# Patient Record
Sex: FEMALE | Race: WHITE | NOT HISPANIC OR LATINO | Employment: OTHER | URBAN - METROPOLITAN AREA
[De-identification: names, ages, dates, MRNs, and addresses within clinical notes are randomized per-mention and may not be internally consistent; named-entity substitution may affect disease eponyms.]

---

## 2019-01-02 ENCOUNTER — COMMUNICATION - HEALTHEAST (OUTPATIENT)
Dept: TELEHEALTH | Facility: CLINIC | Age: 64
End: 2019-01-02

## 2019-01-02 ENCOUNTER — OFFICE VISIT - HEALTHEAST (OUTPATIENT)
Dept: FAMILY MEDICINE | Facility: CLINIC | Age: 64
End: 2019-01-02

## 2019-01-02 DIAGNOSIS — Z00.00 ROUTINE GENERAL MEDICAL EXAMINATION AT A HEALTH CARE FACILITY: ICD-10-CM

## 2019-01-02 DIAGNOSIS — Z12.11 SCREEN FOR COLON CANCER: ICD-10-CM

## 2019-01-02 DIAGNOSIS — Z12.31 VISIT FOR SCREENING MAMMOGRAM: ICD-10-CM

## 2019-01-02 DIAGNOSIS — I10 ESSENTIAL HYPERTENSION: ICD-10-CM

## 2019-01-02 DIAGNOSIS — E66.01 CLASS 2 SEVERE OBESITY DUE TO EXCESS CALORIES WITH SERIOUS COMORBIDITY AND BODY MASS INDEX (BMI) OF 35.0 TO 35.9 IN ADULT (H): ICD-10-CM

## 2019-01-02 DIAGNOSIS — E66.812 CLASS 2 SEVERE OBESITY DUE TO EXCESS CALORIES WITH SERIOUS COMORBIDITY AND BODY MASS INDEX (BMI) OF 35.0 TO 35.9 IN ADULT (H): ICD-10-CM

## 2019-01-02 LAB
ANION GAP SERPL CALCULATED.3IONS-SCNC: 12 MMOL/L (ref 5–18)
BUN SERPL-MCNC: 20 MG/DL (ref 8–22)
CALCIUM SERPL-MCNC: 9.7 MG/DL (ref 8.5–10.5)
CHLORIDE BLD-SCNC: 105 MMOL/L (ref 98–107)
CHOLEST SERPL-MCNC: 267 MG/DL
CO2 SERPL-SCNC: 24 MMOL/L (ref 22–31)
CREAT SERPL-MCNC: 0.89 MG/DL (ref 0.6–1.1)
FASTING STATUS PATIENT QL REPORTED: YES
GFR SERPL CREATININE-BSD FRML MDRD: >60 ML/MIN/1.73M2
GLUCOSE BLD-MCNC: 103 MG/DL (ref 70–125)
HDLC SERPL-MCNC: 54 MG/DL
LDLC SERPL CALC-MCNC: 178 MG/DL
POTASSIUM BLD-SCNC: 4 MMOL/L (ref 3.5–5)
SODIUM SERPL-SCNC: 141 MMOL/L (ref 136–145)
TRIGL SERPL-MCNC: 177 MG/DL

## 2019-01-02 ASSESSMENT — MIFFLIN-ST. JEOR: SCORE: 1360.75

## 2020-01-08 ENCOUNTER — OFFICE VISIT - HEALTHEAST (OUTPATIENT)
Dept: FAMILY MEDICINE | Facility: CLINIC | Age: 65
End: 2020-01-08

## 2020-01-08 DIAGNOSIS — I10 ESSENTIAL HYPERTENSION: ICD-10-CM

## 2020-01-08 DIAGNOSIS — E66.3 OVERWEIGHT: ICD-10-CM

## 2020-01-08 DIAGNOSIS — Z12.31 VISIT FOR SCREENING MAMMOGRAM: ICD-10-CM

## 2020-01-08 DIAGNOSIS — R73.03 PREDIABETES: ICD-10-CM

## 2020-01-08 DIAGNOSIS — Z00.00 ROUTINE GENERAL MEDICAL EXAMINATION AT A HEALTH CARE FACILITY: ICD-10-CM

## 2020-01-08 DIAGNOSIS — Z12.11 SCREEN FOR COLON CANCER: ICD-10-CM

## 2020-01-08 DIAGNOSIS — R73.01 ELEVATED FASTING GLUCOSE: ICD-10-CM

## 2020-01-08 LAB
CHOLEST SERPL-MCNC: 233 MG/DL
FASTING STATUS PATIENT QL REPORTED: YES
FASTING STATUS PATIENT QL REPORTED: YES
GLUCOSE BLD-MCNC: 102 MG/DL (ref 70–99)
HBA1C MFR BLD: 5.8 % (ref 3.5–6)
HDLC SERPL-MCNC: 67 MG/DL
HGB BLD-MCNC: 13.7 G/DL (ref 12–16)
LDLC SERPL CALC-MCNC: 144 MG/DL
TRIGL SERPL-MCNC: 111 MG/DL

## 2020-01-08 ASSESSMENT — MIFFLIN-ST. JEOR: SCORE: 1169.33

## 2020-02-04 ENCOUNTER — HOSPITAL ENCOUNTER (OUTPATIENT)
Dept: MAMMOGRAPHY | Facility: CLINIC | Age: 65
Discharge: HOME | End: 2020-02-04
Attending: FAMILY MEDICINE

## 2020-02-04 DIAGNOSIS — Z12.31 VISIT FOR SCREENING MAMMOGRAM: ICD-10-CM

## 2020-02-05 ENCOUNTER — RECORDS - HEALTHEAST (OUTPATIENT)
Dept: RADIOLOGY | Facility: CLINIC | Age: 65
End: 2020-02-05

## 2020-02-05 ENCOUNTER — RECORDS - HEALTHEAST (OUTPATIENT)
Dept: ADMINISTRATIVE | Facility: OTHER | Age: 65
End: 2020-02-05

## 2020-02-23 ENCOUNTER — HEALTH MAINTENANCE LETTER (OUTPATIENT)
Age: 65
End: 2020-02-23

## 2020-03-16 ENCOUNTER — RECORDS - HEALTHEAST (OUTPATIENT)
Dept: ADMINISTRATIVE | Facility: OTHER | Age: 65
End: 2020-03-16

## 2020-03-30 ENCOUNTER — RECORDS - HEALTHEAST (OUTPATIENT)
Dept: ADMINISTRATIVE | Facility: OTHER | Age: 65
End: 2020-03-30

## 2020-04-14 ENCOUNTER — RECORDS - HEALTHEAST (OUTPATIENT)
Dept: ADMINISTRATIVE | Facility: OTHER | Age: 65
End: 2020-04-14

## 2020-04-30 ENCOUNTER — RECORDS - HEALTHEAST (OUTPATIENT)
Dept: ADMINISTRATIVE | Facility: OTHER | Age: 65
End: 2020-04-30

## 2020-05-14 ENCOUNTER — OFFICE VISIT - HEALTHEAST (OUTPATIENT)
Dept: FAMILY MEDICINE | Facility: CLINIC | Age: 65
End: 2020-05-14

## 2020-05-14 DIAGNOSIS — E66.3 OVERWEIGHT: ICD-10-CM

## 2020-05-14 DIAGNOSIS — R73.03 PREDIABETES: ICD-10-CM

## 2020-05-14 DIAGNOSIS — Z01.818 PRE-OPERATIVE EXAMINATION: ICD-10-CM

## 2020-05-14 DIAGNOSIS — M16.12 ARTHRITIS OF LEFT HIP: ICD-10-CM

## 2020-05-14 DIAGNOSIS — E87.6 HYPOKALEMIA: ICD-10-CM

## 2020-05-14 DIAGNOSIS — I10 ESSENTIAL HYPERTENSION: ICD-10-CM

## 2020-05-14 LAB
ALBUMIN UR-MCNC: NEGATIVE MG/DL
ANION GAP SERPL CALCULATED.3IONS-SCNC: 12 MMOL/L (ref 5–18)
APPEARANCE UR: CLEAR
ATRIAL RATE - MUSE: 68 BPM
BACTERIA #/AREA URNS HPF: ABNORMAL HPF
BILIRUB UR QL STRIP: NEGATIVE
BUN SERPL-MCNC: 29 MG/DL (ref 8–22)
CALCIUM SERPL-MCNC: 10.1 MG/DL (ref 8.5–10.5)
CHLORIDE BLD-SCNC: 99 MMOL/L (ref 98–107)
CO2 SERPL-SCNC: 28 MMOL/L (ref 22–31)
COLOR UR AUTO: YELLOW
CREAT SERPL-MCNC: 1.35 MG/DL (ref 0.6–1.1)
DIASTOLIC BLOOD PRESSURE - MUSE: NORMAL
ERYTHROCYTE [DISTWIDTH] IN BLOOD BY AUTOMATED COUNT: 11.6 % (ref 11–14.5)
GFR SERPL CREATININE-BSD FRML MDRD: 39 ML/MIN/1.73M2
GLUCOSE BLD-MCNC: 95 MG/DL (ref 70–125)
GLUCOSE UR STRIP-MCNC: NEGATIVE MG/DL
HBA1C MFR BLD: 5.7 % (ref 3.5–6)
HCT VFR BLD AUTO: 42 % (ref 35–47)
HGB BLD-MCNC: 13.9 G/DL (ref 12–16)
HGB UR QL STRIP: NEGATIVE
HYALINE CASTS #/AREA URNS LPF: ABNORMAL LPF
INTERPRETATION ECG - MUSE: NORMAL
KETONES UR STRIP-MCNC: NEGATIVE MG/DL
LEUKOCYTE ESTERASE UR QL STRIP: ABNORMAL
MCH RBC QN AUTO: 29.6 PG (ref 27–34)
MCHC RBC AUTO-ENTMCNC: 33.1 G/DL (ref 32–36)
MCV RBC AUTO: 89 FL (ref 80–100)
MUCOUS THREADS #/AREA URNS LPF: ABNORMAL LPF
NITRATE UR QL: NEGATIVE
P AXIS - MUSE: 34 DEGREES
PH UR STRIP: 5 [PH] (ref 5–8)
PLATELET # BLD AUTO: 256 THOU/UL (ref 140–440)
PMV BLD AUTO: 7.2 FL (ref 7–10)
POTASSIUM BLD-SCNC: 3.2 MMOL/L (ref 3.5–5)
PR INTERVAL - MUSE: 198 MS
QRS DURATION - MUSE: 76 MS
QT - MUSE: 414 MS
QTC - MUSE: 440 MS
R AXIS - MUSE: -9 DEGREES
RBC # BLD AUTO: 4.71 MILL/UL (ref 3.8–5.4)
RBC #/AREA URNS AUTO: ABNORMAL HPF
SODIUM SERPL-SCNC: 139 MMOL/L (ref 136–145)
SP GR UR STRIP: 1.02 (ref 1–1.03)
SQUAMOUS #/AREA URNS AUTO: ABNORMAL LPF
SYSTOLIC BLOOD PRESSURE - MUSE: NORMAL
T AXIS - MUSE: -1 DEGREES
TRANS CELLS #/AREA URNS HPF: ABNORMAL LPF
UROBILINOGEN UR STRIP-ACNC: ABNORMAL
VENTRICULAR RATE- MUSE: 68 BPM
WBC #/AREA URNS AUTO: ABNORMAL HPF
WBC: 5.4 THOU/UL (ref 4–11)

## 2020-05-14 ASSESSMENT — MIFFLIN-ST. JEOR: SCORE: 1188.39

## 2020-05-15 LAB — BACTERIA SPEC CULT: NO GROWTH

## 2020-05-28 ENCOUNTER — AMBULATORY - HEALTHEAST (OUTPATIENT)
Dept: LAB | Facility: CLINIC | Age: 65
End: 2020-05-28

## 2020-05-28 DIAGNOSIS — E87.6 HYPOKALEMIA: ICD-10-CM

## 2020-05-28 DIAGNOSIS — I10 ESSENTIAL HYPERTENSION: ICD-10-CM

## 2020-05-28 LAB
ANION GAP SERPL CALCULATED.3IONS-SCNC: 9 MMOL/L (ref 5–18)
BUN SERPL-MCNC: 19 MG/DL (ref 8–22)
CALCIUM SERPL-MCNC: 9.4 MG/DL (ref 8.5–10.5)
CHLORIDE BLD-SCNC: 104 MMOL/L (ref 98–107)
CO2 SERPL-SCNC: 26 MMOL/L (ref 22–31)
CREAT SERPL-MCNC: 1.08 MG/DL (ref 0.6–1.1)
GFR SERPL CREATININE-BSD FRML MDRD: 51 ML/MIN/1.73M2
GLUCOSE BLD-MCNC: 88 MG/DL (ref 70–125)
POTASSIUM BLD-SCNC: 3.6 MMOL/L (ref 3.5–5)
SODIUM SERPL-SCNC: 139 MMOL/L (ref 136–145)

## 2020-06-01 ENCOUNTER — COMMUNICATION - HEALTHEAST (OUTPATIENT)
Dept: FAMILY MEDICINE | Facility: CLINIC | Age: 65
End: 2020-06-01

## 2020-06-02 ENCOUNTER — AMBULATORY - HEALTHEAST (OUTPATIENT)
Dept: SURGERY | Facility: CLINIC | Age: 65
End: 2020-06-02

## 2020-06-02 DIAGNOSIS — Z11.59 ENCOUNTER FOR SCREENING FOR OTHER VIRAL DISEASES: ICD-10-CM

## 2020-06-04 ASSESSMENT — MIFFLIN-ST. JEOR: SCORE: 1135.1

## 2020-06-08 ENCOUNTER — AMBULATORY - HEALTHEAST (OUTPATIENT)
Dept: FAMILY MEDICINE | Facility: CLINIC | Age: 65
End: 2020-06-08

## 2020-06-08 DIAGNOSIS — Z11.59 ENCOUNTER FOR SCREENING FOR OTHER VIRAL DISEASES: ICD-10-CM

## 2020-06-10 ENCOUNTER — SURGERY - HEALTHEAST (OUTPATIENT)
Dept: SURGERY | Facility: CLINIC | Age: 65
End: 2020-06-10

## 2020-06-10 ENCOUNTER — ANESTHESIA - HEALTHEAST (OUTPATIENT)
Dept: SURGERY | Facility: CLINIC | Age: 65
End: 2020-06-10

## 2020-06-10 ASSESSMENT — MIFFLIN-ST. JEOR
SCORE: 1184.99
SCORE: 1184.99

## 2020-06-19 ENCOUNTER — RECORDS - HEALTHEAST (OUTPATIENT)
Dept: ADMINISTRATIVE | Facility: OTHER | Age: 65
End: 2020-06-19

## 2020-07-17 ENCOUNTER — RECORDS - HEALTHEAST (OUTPATIENT)
Dept: ADMINISTRATIVE | Facility: OTHER | Age: 65
End: 2020-07-17

## 2020-09-08 ENCOUNTER — RECORDS - HEALTHEAST (OUTPATIENT)
Dept: ADMINISTRATIVE | Facility: OTHER | Age: 65
End: 2020-09-08

## 2020-10-01 ENCOUNTER — RECORDS - HEALTHEAST (OUTPATIENT)
Dept: ADMINISTRATIVE | Facility: OTHER | Age: 65
End: 2020-10-01

## 2020-10-12 ENCOUNTER — COMMUNICATION - HEALTHEAST (OUTPATIENT)
Dept: FAMILY MEDICINE | Facility: CLINIC | Age: 65
End: 2020-10-12

## 2020-11-17 ENCOUNTER — COMMUNICATION - HEALTHEAST (OUTPATIENT)
Dept: FAMILY MEDICINE | Facility: CLINIC | Age: 65
End: 2020-11-17

## 2020-11-17 DIAGNOSIS — I10 ESSENTIAL HYPERTENSION: ICD-10-CM

## 2020-11-29 ENCOUNTER — HEALTH MAINTENANCE LETTER (OUTPATIENT)
Age: 65
End: 2020-11-29

## 2021-02-16 ENCOUNTER — COMMUNICATION - HEALTHEAST (OUTPATIENT)
Dept: FAMILY MEDICINE | Facility: CLINIC | Age: 66
End: 2021-02-16

## 2021-04-10 ENCOUNTER — HEALTH MAINTENANCE LETTER (OUTPATIENT)
Age: 66
End: 2021-04-10

## 2021-05-15 ENCOUNTER — COMMUNICATION - HEALTHEAST (OUTPATIENT)
Dept: FAMILY MEDICINE | Facility: CLINIC | Age: 66
End: 2021-05-15

## 2021-05-15 DIAGNOSIS — I10 ESSENTIAL HYPERTENSION: ICD-10-CM

## 2021-06-02 VITALS — WEIGHT: 193 LBS | HEIGHT: 62 IN | BODY MASS INDEX: 35.51 KG/M2

## 2021-06-04 VITALS
SYSTOLIC BLOOD PRESSURE: 116 MMHG | DIASTOLIC BLOOD PRESSURE: 74 MMHG | WEIGHT: 150.8 LBS | BODY MASS INDEX: 27.75 KG/M2 | HEIGHT: 62 IN | HEART RATE: 72 BPM

## 2021-06-04 VITALS
BODY MASS INDEX: 28.52 KG/M2 | HEART RATE: 80 BPM | WEIGHT: 155 LBS | RESPIRATION RATE: 16 BRPM | SYSTOLIC BLOOD PRESSURE: 122 MMHG | DIASTOLIC BLOOD PRESSURE: 82 MMHG | HEIGHT: 62 IN | OXYGEN SATURATION: 95 % | TEMPERATURE: 98.3 F

## 2021-06-04 VITALS — BODY MASS INDEX: 29.45 KG/M2 | WEIGHT: 156 LBS | HEIGHT: 61 IN

## 2021-06-05 NOTE — PATIENT INSTRUCTIONS - HE
Decrease HCTZ from 25mg to 12.5mg for 1-2 weeks.   Check BPs at home. If BPs are still lower than 135/85, can consider stopping this medication after 1-2 weeks.   If BPs elevated again, go back to 12.5mg.     Goal is <140/90, but even 135/85 for someone like yourself!    There is a new shingles vaccine that is 97% effective. It is the Shingrix vaccine and is recommended for those 50 and older. We recommend the vaccine even if you have had shingles or the older vaccine against shingles- Zostavax. Insurance coverage for the vaccine varies. I recommend you check with your insurance to verify if, when and where it is covered. The vaccine is covered by most commercial insurance but Medicare does not cover the vaccine. It may be covered by Medicare Part D (your drug/pharmacy plan) and sometimes it is covered only at a pharmacy instead of here in the clinic. If it is covered in the clinic, you may schedule a nurse visit anytime to get the first dose of the vaccine. The second dose is recommended two months after the first and should be gotten by 6 months after the first.   About 10% of people will get a sore, red reaction at the site of the injection. Some people may also feel a little sick or nauseated after the injection. This may happen with either the first and/or second vaccine.

## 2021-06-08 NOTE — ANESTHESIA PREPROCEDURE EVALUATION
Anesthesia Evaluation      Patient summary reviewed   No history of anesthetic complications     Airway   Mallampati: III  Neck ROM: full   Pulmonary - negative ROS and normal exam    breath sounds clear to auscultation                         Cardiovascular - normal exam  Exercise tolerance: > or = 4 METS  (+) hypertension, ,     ECG reviewed  Rhythm: regular  Rate: normal,         Neuro/Psych - negative ROS     Endo/Other    (+) arthritis, obesity,      GI/Hepatic/Renal - negative ROS           Dental    (+) poor dentition and chipped                       Anesthesia Plan  Planned anesthetic: spinal  SAB with isobaric bupivacaine.    Controlled hypotension requested by surgeon.  ASA 2     Anesthetic plan and risks discussed with: patient  Anesthesia plan special considerations: antiemetics,   Post-op plan: routine recovery

## 2021-06-08 NOTE — PATIENT INSTRUCTIONS - HE
Before Your Surgery       Call your surgeon if there is any change in your health. This includes signs of a cold or flu (such as a sore throat, runny nose, cough, rash or fever).       Do not smoke, drink alcohol or take over the counter medicine (unless your surgeon or primary care doctor tells you to) for the 24 hours before and after surgery.       If you take prescribed drugs: Follow your doctor s orders about which medicines to take and which to stop until after surgery.      Eating and drinking prior to surgery: follow the instructions from your surgeon.      Take a shower or bath the night before surgery. Use the soap your surgeon gave you to gently clean your skin. If you do not have soap from your surgeon, use your regular soap. Do not shave or scrub the surgery site. Wear clean pajamas and have clean sheets on your bed.             Hold all supplements, aspirin and NSAIDs for 7 days prior to surgery.    Follow your surgeon's direction on when to stop eating and drinking prior to surgery.

## 2021-06-08 NOTE — PROGRESS NOTES
Preoperative Exam    Scheduled Procedure: Left Total Hip Replacement  Surgery Date:  6/10/2020  Surgery Location: [facility], fax [phone]    Surgeon:  Dr. Be Whitt    Assessment/Plan:     1. Pre-operative examination  2. Arthritis of left hip  Medically optimized to proceed with surgery.   - Will need COVID screening by PCR prior to surgery to be ordered by surgeon.   - Electrocardiogram Perform and Read: NSR with VR 68, no pathologic q waves, non specific t wave flattening in III; clinically asymptomatic. No contraindication for proceeding with surgery   - HM2(CBC w/o Differential)- Hb 13.9, Plts 256  - Basic Metabolic Panel- K is mildly low at 3.2, and BUn elevated at 29 with Cr slightly up at 1.35- suspect dehydration in setting of recent HCTZ dose increase (pt did this on her own due to intermittently high BPs): recheck after rehydrating early next week:  BMP 5/28 normalized after hydration. K 3.6, Cr 1.08, BUN 19.   - Urinalysis Macro & Micro- trace leukocytes, but culture is no growth  - Culture, Urine- no growth     3. Essential hypertension  Well controlled on 12.5mg HCTZ (2 days she took HCTZ at 25mg dose due to mildly elevated BPs); reviewed recommendations to take this on AM of surgery vs holding and she agrees to continue med morning of surgery.   - BMP     4. Prediabetes  Last A1c 5.8% in Jan; will recheck with labs today.   - Glycosylated Hemoglobin A1c      5. Overweight  Obviously her goal is to get back to physical activity and this elective hip replacement surgery will better facilitate this.  She is looking forward to improving quality of life and ability to exercise going forward.        Surgical Procedure Risk: Intermediate (reported cardiac risk generally 1-5%)  Have you had prior anesthesia?: No  Have you or any family members had a previous anesthesia reaction:  No  Do you or any family members have a history of a clotting or bleeding disorder?: No  Cardiac Risk Assessment: no  "increased risk for major cardiac complications    APPROVAL GIVEN to proceed with proposed procedure, after further diagnostic evaluation (needs COVID testing per surgeon)    Functional Status: Independent  Patient plans to recover at home with family.     Subjective:      Glenys Garcia is a 64 y.o. female who presents for a preoperative consultation.  She has left hip pain and recently escalating in Jan and Feb. Causing severe pain with ambulation. Had two sessions of PT and this exacerbated the pain. Left hip and groin pain.   Has not done any cortisone shots as she desired more urgent and sustained relief with operation.  Goal to get back to her normal routine.   Using advil, tylenol and tramadol sparingly when she needs to go in the car.     She did travel to [place]- [facility] 3/7 after a 1 month stay.     HTN: Bps \"really good\" in Feb and March in the 120s/70s. Checks twice weekly.  Went to 130s/80s the past few weeks due to worsening pain. Bps have never been over 140/90.  She did increase her HCTZ 12.5mg to 25mg for a few days when HTN was high. For the past week she has been back down to 12.5mg daily.     All other systems reviewed and are negative, other than those listed in the HPI.    Pertinent History  Do you have difficulty breathing or chest pain after walking up a flight of stairs: No  History of obstructive sleep apnea: No  Steroid use in the last 6 months: No  Frequent Aspirin/NSAID use: Yes: frequent advil use  Prior Blood Transfusion: No  Prior Blood Transfusion Reaction: No  If for some reason prior to, during or after the procedure, if it is medically indicated, would you be willing to have a blood transfusion?:  There is no transfusion refusal.    Current Outpatient Medications   Medication Sig Dispense Refill     hydroCHLOROthiazide (HYDRODIURIL) 12.5 MG tablet Take 1 tablet (12.5 mg total) by mouth daily. 90 tablet 3     multivitamin (ONE A DAY) Take by mouth.       traMADoL (ULTRAM) 50 mg " "tablet TAKE 1 2 TABLET EVERY 6 8 HOURS AS NEEDED FOR PAIN.       No current facility-administered medications for this visit.         No Known Allergies    Patient Active Problem List   Diagnosis     Overweight     Essential hypertension     Prediabetes       History reviewed. No pertinent past medical history.    History reviewed. No pertinent surgical history.    Social History     Social History Narrative    Living in a [facility]. . 2 children, 4 grandchildren; all live in the area. 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 Nonsmoker; no alcohol.     Amy Donaldson MD         Patient Care Team:  Amy Donaldson MD as PCP - General (Family Medicine)  Amy Donaldson MD as Assigned PCP          Objective:     Vitals:    05/14/20 0953   BP: 122/82   Pulse: 80   Resp: 16   Temp: 98.3  F (36.8  C)   TempSrc: Oral   SpO2: 95%   Weight: 155 lb (70.3 kg)   Height: 5' 1.5\" (1.562 m)         Physical Exam:  Constitutional: Patient is oriented to person, place, and time. Patient appears well-developed and well-nourished. No distress.   Head: Normocephalic and atraumatic.   Ears: External ear and TMs normal bilaterally.  Nose: Nose normal.   Mouth/Throat: Oropharynx is clear and moist. No oropharyngeal exudate.   Eyes: Conjunctivae and EOM are normal. Pupils are equal, round, and reactive to light. No discharge. No scleral icterus.   Neck: Neck supple. No JVD present. No tracheal deviation present. No thyromegaly present.  Cardiovascular: Normal rate, regular rhythm, normal heart sounds and intact distal pulses. No murmur heard.   Pulmonary/Chest: Effort normal and breath sounds normal. Patient has no wheezes, no rales, exhibits no tenderness.   Abdominal: Soft. Bowel sounds are normal. No masses. There is no tenderness.   Lymphadenopathy:  Patient has no cervical adenopathy.   Neurological: Patient is alert and oriented to person, place, and time. Patient has normal reflexes. No cranial " nerve deficit. Coordination normal.   Skin: Skin is warm and dry. No rash noted. No pallor.   MSK: Pain at the left inner groin to palpation in the left buttocks. Negative seated straight leg raise but this does cause discomfort in the hip and groin area on the left side.    Psychiatric: Patient has good eye contact without any psychomotor retardation or stereotypic behaviors.  normal mood and affect. Judgment and thought content normal.   Speech is regular rate and rhythm.       Patient Instructions      Before Your Surgery       Call your surgeon if there is any change in your health. This includes signs of a cold or flu (such as a sore throat, runny nose, cough, rash or fever).       Do not smoke, drink alcohol or take over the counter medicine (unless your surgeon or primary care doctor tells you to) for the 24 hours before and after surgery.       If you take prescribed drugs: Follow your doctor s orders about which medicines to take and which to stop until after surgery.      Eating and drinking prior to surgery: follow the instructions from your surgeon.      Take a shower or bath the night before surgery. Use the soap your surgeon gave you to gently clean your skin. If you do not have soap from your surgeon, use your regular soap. Do not shave or scrub the surgery site. Wear clean pajamas and have clean sheets on your bed.             Hold all supplements, aspirin and NSAIDs for 7 days prior to surgery.    Follow your surgeon's direction on when to stop eating and drinking prior to surgery.          Labs:  Recent Results (from the past 24 hour(s))   Electrocardiogram Perform and Read    Collection Time: 05/14/20 10:37 AM   Result Value Ref Range    SYSTOLIC BLOOD PRESSURE      DIASTOLIC BLOOD PRESSURE      VENTRICULAR RATE 68 BPM    ATRIAL RATE 68 BPM    P-R INTERVAL 198 ms    QRS DURATION 76 ms    Q-T INTERVAL 414 ms    QTC CALCULATION (BEZET) 440 ms    P Axis 34 degrees    R AXIS -9 degrees    T AXIS -1  degrees    MUSE DIAGNOSIS       Normal sinus rhythm  Nonspecific ST and T wave abnormality  Abnormal ECG  No previous ECGs available       Results for orders placed or performed in visit on 05/14/20   Basic Metabolic Panel   Result Value Ref Range    Sodium 139 136 - 145 mmol/L    Potassium 3.2 (L) 3.5 - 5.0 mmol/L    Chloride 99 98 - 107 mmol/L    CO2 28 22 - 31 mmol/L    Anion Gap, Calculation 12 5 - 18 mmol/L    Glucose 95 70 - 125 mg/dL    Calcium 10.1 8.5 - 10.5 mg/dL    BUN 29 (H) 8 - 22 mg/dL    Creatinine 1.35 (H) 0.60 - 1.10 mg/dL    GFR MDRD Af Amer 48 (L) >60 mL/min/1.73m2    GFR MDRD Non Af Amer 39 (L) >60 mL/min/1.73m2     Lab Results   Component Value Date    HGBA1C 5.7 05/14/2020     Lab Results   Component Value Date    WBC 5.4 05/14/2020    HGB 13.9 05/14/2020    HCT 42.0 05/14/2020    MCV 89 05/14/2020     05/14/2020       Urine culture is no growth        Immunization History   Administered Date(s) Administered     Pneumo Conj 13-V (2010&after) 01/08/2020     Td, Adult, Absorbed 09/23/1991, 01/14/2003     Td, adult adsorbed, PF 06/01/2005     Tdap 07/16/2013     ZOSTER, LIVE 11/19/2015     ZOSTER, RECOMBINANT, IM 01/12/2020, 03/16/2020           Electronically signed by Amy Donaldson MD 05/14/20 8:36 AM

## 2021-06-08 NOTE — ANESTHESIA CARE TRANSFER NOTE
Last vitals:   Vitals:    06/10/20 1333   BP: 92/55   Pulse: 77   Resp: 16   Temp: (!) 35.8  C (96.5  F)   SpO2: 98%     Patient's level of consciousness is awake  Spontaneous respirations: yes  Maintains airway independently: yes  Dentition unchanged: yes  Oropharynx: oropharynx clear of all foreign objects    QCDR Measures:  ASA# 20 - Surgical Safety Checklist: WHO surgical safety checklist completed prior to induction    PQRS# 430 - Adult PONV Prevention: NA - Not adult patient, not GA or 3 or more risk factors NOT present  ASA# 8 - Peds PONV Prevention: NA - Not pediatric patient, not GA or 2 or more risk factors NOT present  PQRS# 424 - Terra-op Temp Management: 4559F - At least one body temp DOCUMENTED => 35.5C or 95.9F within required timeframe  PQRS# 426 - PACU Transfer Protocol: - Transfer of care checklist used  ASA# 14 - Acute Post-op Pain: ASA14B - Patient did NOT experience pain >= 7 out of 10

## 2021-06-08 NOTE — ANESTHESIA PROCEDURE NOTES
Spinal Block    Patient location during procedure: OR  Start time: 6/10/2020 11:28 AM  End time: 6/10/2020 11:29 AM  Reason for block: at surgeon's request and primary anesthetic    Staffing:  Performing  Anesthesiologist: Maryjane Swift MD    Preanesthetic Checklist  Completed: patient identified, risks, benefits, and alternatives discussed, timeout performed, consent obtained, at patient's request, airway assessed, oxygen available, suction available, emergency drugs available and hand hygiene performed  Spinal Block  Patient position: sitting  Prep: ChloraPrep  Patient monitoring: heart rate, cardiac monitor, continuous pulse ox and blood pressure  Approach: midline  Location: L2-3  Injection technique: single-shot  Needle type: pencil-tip   Needle gauge: 24 G      Additional Notes:  No issues.  Good block

## 2021-06-08 NOTE — PROGRESS NOTES
Read patient Dr. Donaldson's result note regarding lab results and helped schedule her for labs to be rechecked 5/28/20 per patient preference.   Radha Peguero LPN

## 2021-06-08 NOTE — ANESTHESIA POSTPROCEDURE EVALUATION
Patient: Glenys Garcia  Procedure(s):  DIRECT ANTERIOR TOTAL HIP ARTHROPLASTY (Left)  Anesthesia type: spinal    Patient location: PACU  Last vitals:   Vitals Value Taken Time   BP 94/50 6/10/2020  1:52 PM   Temp 35.8  C (96.5  F) 6/10/2020  1:33 PM   Pulse 71 6/10/2020  2:00 PM   Resp 18 6/10/2020  2:00 PM   SpO2 98 % 6/10/2020  2:00 PM   Vitals shown include unvalidated device data.  Post vital signs: stable  Level of consciousness: awake and responds to simple questions  Post-anesthesia pain: pain controlled  Post-anesthesia nausea and vomiting: no  Pulmonary: unassisted, return to baseline  Cardiovascular: stable and blood pressure at baseline  Hydration: adequate  Anesthetic events: no    QCDR Measures:  ASA# 11 - Terra-op Cardiac Arrest: ASA11B - Patient did NOT experience unanticipated cardiac arrest  ASA# 12 - Terra-op Mortality Rate: ASA12B - Patient did NOT die  ASA# 13 - PACU Re-Intubation Rate: NA - No ETT / LMA used for case  ASA# 10 - Composite Anes Safety: ASA10A - No serious adverse event    Additional Notes:

## 2021-06-13 NOTE — TELEPHONE ENCOUNTER
Refill Approved    Rx renewed per Medication Renewal Policy. Medication was last renewed on 1/8/20.    Glenys Aguilar, [facility] Connection Triage/Med Refill 11/19/2020     Requested Prescriptions   Pending Prescriptions Disp Refills     hydroCHLOROthiazide (HYDRODIURIL) 12.5 MG tablet [Pharmacy Med Name: HYDROCHLOROTHIAZIDE 12.5 MG TB] 90 tablet 3     Sig: TAKE 1 TABLET BY MOUTH EVERY DAY       Diuretics/Combination Diuretics Refill Protocol  Passed - 11/17/2020  8:36 PM        Passed - Visit with PCP or prescribing provider visit in past 12 months     Last office visit with prescriber/PCP: Visit date not found OR same dept: Visit date not found OR same specialty: Visit date not found  Last physical: 5/14/2020 Last MTM visit: Visit date not found   Next visit within 3 mo: Visit date not found  Next physical within 3 mo: Visit date not found  Prescriber OR PCP: Amy Donaldson MD  Last diagnosis associated with med order: 1. Essential hypertension  - hydroCHLOROthiazide (HYDRODIURIL) 12.5 MG tablet [Pharmacy Med Name: HYDROCHLOROTHIAZIDE 12.5 MG TB]; TAKE 1 TABLET BY MOUTH EVERY DAY  Dispense: 90 tablet; Refill: 3    If protocol passes may refill for 12 months if within 3 months of last provider visit (or a total of 15 months).             Passed - Serum Potassium in past 12 months      Lab Results   Component Value Date    Potassium 4.5 06/11/2020             Passed - Serum Sodium in past 12 months      Lab Results   Component Value Date    Sodium 135 (L) 06/11/2020             Passed - Blood pressure on file in past 12 months     BP Readings from Last 1 Encounters:   06/11/20 107/62             Passed - Serum Creatinine in past 12 months      Creatinine   Date Value Ref Range Status   06/11/2020 0.99 0.60 - 1.10 mg/dL Final

## 2021-06-16 PROBLEM — Z96.642 STATUS POST TOTAL REPLACEMENT OF LEFT HIP: Status: ACTIVE | Noted: 2020-06-11

## 2021-06-16 PROBLEM — E66.3 OVERWEIGHT: Status: ACTIVE | Noted: 2019-01-02

## 2021-06-16 PROBLEM — R73.03 PREDIABETES: Status: ACTIVE | Noted: 2020-01-08

## 2021-06-16 PROBLEM — I10 ESSENTIAL HYPERTENSION: Status: ACTIVE | Noted: 2019-01-02

## 2021-06-16 PROBLEM — M16.12 PRIMARY OSTEOARTHRITIS OF LEFT HIP: Status: ACTIVE | Noted: 2020-06-10

## 2021-06-17 NOTE — TELEPHONE ENCOUNTER
RN cannot approve Refill Request    RN can NOT refill this medication PCP messaged that patient is overdue for Office Visit. Last office visit: Visit date not found Last Physical: 5/14/2020 Last MTM visit: Visit date not found Last visit same specialty: Visit date not found.  Next visit within 3 mo: Visit date not found  Next physical within 3 mo: Visit date not found      Joan Andino, [facility] Triage/Med Refill 5/15/2021    Requested Prescriptions   Pending Prescriptions Disp Refills     hydroCHLOROthiazide (HYDRODIURIL) 12.5 MG tablet [Pharmacy Med Name: HYDROCHLOROTHIAZIDE 12.5 MG TB] 90 tablet 1     Sig: TAKE 1 TABLET BY MOUTH EVERY DAY       Diuretics/Combination Diuretics Refill Protocol  Failed - 5/15/2021 10:26 AM        Failed - Visit with PCP or prescribing provider visit in past 12 months     Last office visit with prescriber/PCP: Visit date not found OR same dept: Visit date not found OR same specialty: Visit date not found  Last physical: 5/14/2020 Last MTM visit: Visit date not found   Next visit within 3 mo: Visit date not found  Next physical within 3 mo: Visit date not found  Prescriber OR PCP: Amy Donaldson MD  Last diagnosis associated with med order: 1. Essential hypertension  - hydroCHLOROthiazide (HYDRODIURIL) 12.5 MG tablet [Pharmacy Med Name: HYDROCHLOROTHIAZIDE 12.5 MG TB]; TAKE 1 TABLET BY MOUTH EVERY DAY  Dispense: 90 tablet; Refill: 1    If protocol passes may refill for 12 months if within 3 months of last provider visit (or a total of 15 months).             Passed - Serum Potassium in past 12 months      Lab Results   Component Value Date    Potassium 4.5 06/11/2020             Passed - Serum Sodium in past 12 months      Lab Results   Component Value Date    Sodium 135 (L) 06/11/2020             Passed - Blood pressure on file in past 12 months     BP Readings from Last 1 Encounters:   06/11/20 107/62             Passed - Serum Creatinine in past 12 months       Creatinine   Date Value Ref Range Status   06/11/2020 0.99 0.60 - 1.10 mg/dL Final

## 2021-06-22 NOTE — PROGRESS NOTES
The 10-year ASCVD risk score ([place]lewis IRVING Jr., et al., 2013) is: 7%, borderline for needing statin and okay to continue with diet lifestyle changes first.  Electrolytes normal.  My chart sent.

## 2021-06-27 NOTE — PROGRESS NOTES
Progress Notes by Amy Donaldson MD at 1/2/2019  8:00 AM     Author: Amy Donaldson MD Service: -- Author Type: Physician    Filed: 1/2/2019  8:44 AM Encounter Date: 1/2/2019 Status: Signed    : Amy Donaldson MD (Physician)       FEMALE PREVENTATIVE EXAM    Assessment and Plan:       1. Routine general medical examination at a health care facility  Pap up to date, due in 2020.   - Glucose  - Lipid Cascade    2. Visit for screening mammogram  Due 12/2019  - Mammo Screening Bilateral; Future    3. Screen for colon cancer  She is up to date for this, due 6/2020    4. Class 2 severe obesity due to excess calories with serious comorbidity and body mass index (BMI) of 35.0 to 35.9 in adult (H)  Risks of obesity were discussed, including co-morbidities such as diabetes, HTN, HLD, CAD and stroke.   - encouraged moderate physical activity of 150 minutes per week  - encouraged healthy dietary choices like eating real foods, increasing protein & vegetables, and watching portion sizes.  - interested in scheduling weight loss intake with me in near future  - W2W resources provided    5. Essential hypertension  Well controlled, refill sent. BMP today. F/u in 6 months to 1 year.  - Basic Metabolic Panel  - hydroCHLOROthiazide (HYDRODIURIL) 25 MG tablet; Take 1 tablet (25 mg total) by mouth daily.  Dispense: 90 tablet; Refill: 3        Next follow up:  Return in about 3 months (around 4/2/2019) for weight loss f/u.    Immunization Review  Adult Imm Review: Declines immunizations today  will call about Mi    I discussed the following with the patient:   Adult Healthy Living: Importance of regular exercise  Healthy nutrition  Getting adequate sleep  Stress management  Distracted driving  Supplement use      Subjective:   Chief Complaint: Glenys Garcia is an 63 y.o. female here for a preventative health visit.     HPI: No concerns.  Mammogram 12/19/2017- wants to screen every 2 years.  Colonoscopy  "done 6/10/2010 but unable to view results on care everywhere. She reports it was normal. Due in 6/ 2020.    Pap with co-testing was normal 11/19/2015. Due in 2020.   Fasting today.     She is hypertension, currently on hydrochlorothiazide. No LH/dizziness. BPs at home 118-124/78-80, checks weekly.   Healthy Habits  Are you taking a daily aspirin? No  Do you typically exercising at least 40 min, 3-4 times per week?  NO  Do you usually eat at least 4 servings of fruit and vegetables a day, include whole grains and fiber and avoid regularly eating high fat foods? NO  Have you had an eye exam in the past two years? Yes  Do you see a dentist twice per year? Yes  Do you have any concerns regarding sleep? No    Safety Screen  If you own firearms, are they secured in a locked gun cabinet or with trigger locks? NO  Do you feel you are safe where you are living?: Yes (1/2/2019  7:51 AM)  Do you feel you are safe in your relationship(s)?: Yes (1/2/2019  7:51 AM)      Review of Systems:  Please see above.  The rest of the review of systems are negative for all systems.     Patient Care Team:  Amy Donaldson MD as PCP - General (Family Medicine)        History     Reviewed By Date/Time Sections Reviewed    Amy Donaldson MD 1/2/2019  8:43 AM Medical, Surgical, Tobacco, Family    Amy Donaldson MD 1/2/2019  8:21 AM Social Documentation    Xochitl Da Silva CMA 1/2/2019  8:00 AM Family    Xochitl Da Silva CMA 1/2/2019  7:54 AM Tobacco            Objective:   Vital Signs:   Visit Vitals  /82   Pulse 82   Ht 5' 1.5\" (1.562 m)   Wt 193 lb (87.5 kg)   LMP  (LMP Unknown)   SpO2 98%   BMI 35.88 kg/m           PHYSICAL EXAM  Constitutional: Patient is oriented to person, place, and time. Patient appears well-developed and well-nourished. No distress.   Head: Normocephalic and atraumatic.   Ears: External ear and TMs normal bilaterally.  Nose: Nose normal.   Mouth/Throat: Oropharynx is clear and moist. No " oropharyngeal exudate.   Eyes: Conjunctivae and EOM are normal. Pupils are equal, round, and reactive to light. No discharge. No scleral icterus.   Neck: Neck supple. No JVD present. No tracheal deviation present. No thyromegaly present.  Breasts: not indicated based on USPSTF recommendations for asymptomatic women  Cardiovascular: Normal rate, regular rhythm, normal heart sounds and intact distal pulses. No murmur heard.   Pulmonary/Chest: Effort normal and breath sounds normal. Patient has no wheezes, no rales, exhibits no tenderness.   Abdominal: Soft. Bowel sounds are normal. No masses. There is no tenderness.   Lymphadenopathy:  Patient has no cervical adenopathy.   Neurological: Patient is alert and oriented to person, place, and time. Patient has normal reflexes. No cranial nerve deficit. Coordination normal.   Skin: Skin is warm and dry. No rash noted. No pallor.   Pelvic: not indicated based on USPSTF recommendations for asymptomatic women  Psychiatric: Patient has good eye contact without any psychomotor retardation or stereotypic behaviors.  normal mood and affect. Judgment and thought content normal.   Speech is regular rate and rhythm.       The ASCVD Risk score (Flynn DC Jr., et al., 2013) failed to calculate for the following reasons:    Cannot find a previous HDL lab    Cannot find a previous total cholesterol lab         Medication List           Accurate as of 1/2/19  8:43 AM. If you have any questions, ask your nurse or doctor.               CHANGE how you take these medications    hydroCHLOROthiazide 25 MG tablet  Also known as:  HYDRODIURIL  INSTRUCTIONS:  Take 1 tablet (25 mg total) by mouth daily.  What changed:  when to take this  Changed by:  Amy Donaldson MD           CONTINUE taking these medications    multivitamin  Also known as:  ONE A DAY  INSTRUCTIONS:  Take by mouth.              Where to Get Your Medications      These medications were sent to [facility] [id number] [place] [facility]  [place] - [address] [place] [address] [address] [facility] [address]    Phone:  [phone]     hydroCHLOROthiazide 25 MG tablet         Additional Screenings Completed Today:   See PHQ9 and GAD7 flowsheets- both score of 1

## 2021-06-28 NOTE — PROGRESS NOTES
Progress Notes by Amy Donaldson MD at 1/8/2020  8:00 AM     Author: Amy Donaldson MD Service: -- Author Type: Physician    Filed: 1/8/2020  8:18 PM Encounter Date: 1/8/2020 Status: Addendum    : Amy Donaldson MD (Physician)    Related Notes: Original Note by Amy Donaldson MD (Physician) filed at 1/8/2020  8:17 PM       FEMALE PREVENTATIVE EXAM    Assessment and Plan:     1. Routine general medical examination at a health care facility  - Pap, would be due later this year but would have aged out of screening, also past 10yrs of testing normal, low risk  - Declines DXA screening (no famhx osteoporosis or fractures, she has no hx of cancer, no high risk medications, continues weight bearing activity)  - Lipid Cascade  - Glucose  - Hemoglobin  - Pneumococcal conjugate vaccine 13-valent 6wks-17yrs; >50yrs  - She will think about Shingrix    2. Essential hypertension  Given 45lb weight loss over past year, reasonable to trial dose reduction or weaning off HCTZ per her inquiry.   - Decrease HCTZ from 25mg to 12.5mg.   - Check BPs at home. If still lower than 135/85, can consider stopping after 1-2 weeks on the lower dose. If BPs elevated again, go back to 12.5mg.   - She will mychart me with update on dose that is working for her  - Congratulated on weight loss efforts, continue low salt diet  - hydroCHLOROthiazide (HYDRODIURIL) 12.5 MG tablet; Take 1 tablet (12.5 mg total) by mouth daily.  Dispense: 90 tablet; Refill: 3    3. Overweight  Significant improvements- moved her BMI from 35+ to 28 with 45lb intentional weight loss over past year.  Risks of obesity were discussed, including co-morbidities such as diabetes, HTN, HLD, CAD and stroke.   - encouraged moderate physical activity of 150 minutes per week  - encouraged healthy dietary choices like eating real foods, increasing protein & vegetables, and watching portion sizes.    4. Elevated fasting glucose  Glucose 102, last year  103, will add on A1c this time. Returned at 5.8%, consistent with prediabetes. Will continue to monitor with further weight loss efforts/goals.   - Glycosylated Hemoglobin A1c    5. Screen for colon cancer  - Ambulatory referral for Colonoscopy    6. Visit for screening mammogram  Will schedule       Next follow up:  Return in about 6 months (around 7/8/2020) for Recheck if sx not improved or sooner if worsening, HTN f/u.    Immunization Review  Adult Imm Review: Due today, orders placed    I discussed the following with the patient:   Adult Healthy Living: Importance of regular exercise  Healthy nutrition  Getting adequate sleep  Stress management  Use of seat belts  Sporting equipment safety  Supplement use  Herbal medications/alternative medical therapies    I have had an Advance Directives discussion with the patient.    Subjective:   Chief Complaint: Glenys Garcia is an 64 y.o. female here for a preventative health visit.     HPI:    Chief Complaint   Patient presents with   ? Establish Care   ? Annual Exam     fasting     Mammogram scheduled in Feb.  Colonoscopy done 6/10/2010 but unable to view results on care everywhere. She reports it was normal. Due in 6/ 2020.    Pap with co-testing was normal 11/19/2015. Due 11/2020 but she would be 65 at that point. Also, prior paps in the 10 years have been normal. Desires to d/c screening.    She is hypertension, currently on hydrochlorothiazide 25mg. No LH/dizziness. BPs at home 114-120/70s at home, checks weekly. She has lost 45lbs by home scale since last year!  She has significantly cut down amount of food she eats. Doing more fruit/veggies, fish/white meat and salads. Cutting down on carbs. Wants to decrease another 12lbs yet.   Does walk a lot.     Social History     Social History Narrative    Living in a [facility]. . 2 children, 4 grandchildren; all live in the area. 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 Nonsmoker; no alcohol.      "Amy Donaldson MD         Healthy Habits  Are you taking a daily aspirin? No  Do you typically exercising at least 40 min, 3-4 times per week?  NO- walks 2 miles a day   Do you usually eat at least 4 servings of fruit and vegetables a day, include whole grains and fiber and avoid regularly eating high fat foods? Yes  Have you had an eye exam in the past two years? Yes  Do you see a dentist twice per year? Yes  Do you have any concerns regarding sleep? No    Safety Screen  If you own firearms, are they secured in a locked gun cabinet or with trigger locks? The patient does not own any firearms  Do you feel you are safe where you are living?: Yes (1/8/2020  8:00 AM)  Do you feel you are safe in your relationship(s)?: Yes (1/8/2020  8:00 AM)      Review of Systems:  Please see above.  The rest of the review of systems are negative for all systems.     Cancer Screening       Status Date      COLONOSCOPY Overdue 1/1/2020      Done 1/1/2010     MAMMOGRAM Next Due 12/17/2019      Done 12/17/2017           Patient Care Team:  Amy Donaldson MD as PCP - General (Family Medicine)  Amy Donaldson MD as Assigned PCP        History     Reviewed By Date/Time Sections Reviewed    Amy Donaldson MD 1/8/2020  8:10 PM Medical, Surgical, Tobacco, Family            Objective:   Vital Signs:   Visit Vitals  /74 (Patient Site: Left Arm, Patient Position: Sitting, Cuff Size: Adult Regular)   Pulse 72   Ht 5' 1.5\" (1.562 m)   Wt 150 lb 12.8 oz (68.4 kg)   LMP  (LMP Unknown)   BMI 28.03 kg/m           PHYSICAL EXAM  Constitutional: Patient is oriented to person, place, and time. Patient appears well-developed and well-nourished. No distress.   Head: Normocephalic and atraumatic.   Ears: External ear and TMs normal bilaterally.  Nose: Nose normal.   Mouth/Throat: Oropharynx is clear and moist. No oropharyngeal exudate.   Eyes: Conjunctivae and EOM are normal. Pupils are equal, round, and reactive to light. No " discharge. No scleral icterus.   Neck: Neck supple. No JVD present. No tracheal deviation present. No thyromegaly present.  Breasts: pt declined exam  Cardiovascular: Normal rate, regular rhythm, normal heart sounds and intact distal pulses. No murmur heard.   Pulmonary/Chest: Effort normal and breath sounds normal. Patient has no wheezes, no rales, exhibits no tenderness.   Abdominal: Soft. Bowel sounds are normal. No masses. There is no tenderness.   Lymphadenopathy:  Patient has no cervical adenopathy.   Neurological: Patient is alert and oriented to person, place, and time. Patient has normal reflexes. No cranial nerve deficit. Coordination normal.   Skin: Skin is warm and dry. No rash noted. No pallor.   Pelvic: not indicated based on USPSTF recommendations for asymptomatic women  Psychiatric: Patient has good eye contact without any psychomotor retardation or stereotypic behaviors.  normal mood and affect. Judgment and thought content normal.   Speech is regular rate and rhythm.       The 10-year ASCVD risk score ([place] MARIA FERNANDA Jr., et al., 2013) is: 6.1%    Values used to calculate the score:      Age: 64 years      Sex: Female      Is Non- : No      Diabetic: No      Tobacco smoker: No      Systolic Blood Pressure: 116 mmHg      Is BP treated: Yes      HDL Cholesterol: 67 mg/dL      Total Cholesterol: 233 mg/dL         Medication List          Accurate as of January 8, 2020  8:16 PM. If you have any questions, ask your nurse or doctor.            CHANGE how you take these medications    hydroCHLOROthiazide 12.5 MG tablet  Also known as:  HYDRODIURIL  INSTRUCTIONS:  Take 1 tablet (12.5 mg total) by mouth daily.  What changed:      medication strength    how much to take  Changed by:  Amy Donaldson MD           CONTINUE taking these medications    multivitamin  Also known as:  ONE A DAY  INSTRUCTIONS:  Take by mouth.              Where to Get Your Medications      These medications  were sent to [facility] [id number] [place] [facility] [place] [address] [address] [place] [address] [facility] [address]    Phone:  [phone]     hydroCHLOROthiazide 12.5 MG tablet         Additional Screenings Completed Today:   Little interest or pleasure in doing things: Not at all  Feeling down, depressed, or hopeless: Not at all

## 2021-07-03 NOTE — ADDENDUM NOTE
Addendum Note by Melinda Sanchez RT (R) at 1/2/2019  8:00 AM     Author: Melinda Sanchez RT (R) Service: -- Author Type: Radiologic Technologist    Filed: 1/2/2019  8:45 AM Encounter Date: 1/2/2019 Status: Signed    : Melinda Sanchez RT (R) (Radiologic Technologist)    Addended by: MELINDA SANCHEZ on: 1/2/2019 08:45 AM        Modules accepted: Orders

## 2021-07-31 ENCOUNTER — HEALTH MAINTENANCE LETTER (OUTPATIENT)
Age: 66
End: 2021-07-31

## 2021-09-25 ENCOUNTER — HEALTH MAINTENANCE LETTER (OUTPATIENT)
Age: 66
End: 2021-09-25

## 2021-11-09 DIAGNOSIS — I10 ESSENTIAL (PRIMARY) HYPERTENSION: ICD-10-CM

## 2021-11-11 RX ORDER — HYDROCHLOROTHIAZIDE 12.5 MG/1
TABLET ORAL
Qty: 90 TABLET | Refills: 0 | Status: SHIPPED | OUTPATIENT
Start: 2021-11-11 | End: 2022-02-08

## 2021-11-11 NOTE — TELEPHONE ENCOUNTER
"Routing refill request to provider for review/approval because:  Labs not current:  Multiple labs  Patient needs to be seen because it has been more than 1 year since last office visit.  BP overdue  Due to medication information not transferring due to [facility] please review the medication information prior to signing to ensure accuracy.    Last Written Prescription:      Last office visit provider:  5/14/20    Requested Prescriptions   Pending Prescriptions Disp Refills     hydrochlorothiazide (HYDRODIURIL) 12.5 MG tablet [Pharmacy Med Name: HYDROCHLOROTHIAZIDE 12.5 MG TB] 90 tablet 1     Sig: TAKE 1 TABLET BY MOUTH EVERY DAY       Diuretics (Including Combos) Protocol Failed - 11/9/2021  5:57 PM        Failed - Blood pressure under 140/90 in past 12 months     BP Readings from Last 3 Encounters:   05/14/20 122/82   01/08/20 116/74   08/19/06 132/86                 Failed - Recent (12 mo) or future (30 days) visit within the authorizing provider's specialty     Patient has had an office visit with the authorizing provider or a provider within the authorizing providers department within the previous 12 mos or has a future within next 30 days. See \"Patient Info\" tab in inbasket, or \"Choose Columns\" in Meds & Orders section of the refill encounter.              Failed - Medication is active on med list        Failed - Normal serum creatinine on file in past 12 months     Recent Labs   Lab Test 06/11/20  0312   CR 0.99              Failed - Normal serum potassium on file in past 12 months     Recent Labs   Lab Test 06/11/20  0312   POTASSIUM 4.5                    Failed - Normal serum sodium on file in past 12 months     Recent Labs   Lab Test 06/11/20  0312   *              Passed - Patient is age 18 or older        Passed - No active pregancy on record        Passed - No positive pregnancy test in past 12 months             Tonya Scott RN 11/10/21 6:14 PM  "

## 2021-11-11 NOTE — TELEPHONE ENCOUNTER
Please schedule pt for annual exam (if due) or med check visit prior to more refills after this one. Thanks.

## 2021-11-11 NOTE — TELEPHONE ENCOUNTER
SANDOVAL Veronica to schedule a wellness check prior to more refills. TAYO BRADSHAW on 11/11/2021 at 3:56 PM

## 2021-11-20 ENCOUNTER — HEALTH MAINTENANCE LETTER (OUTPATIENT)
Age: 66
End: 2021-11-20

## 2022-02-06 DIAGNOSIS — I10 ESSENTIAL (PRIMARY) HYPERTENSION: ICD-10-CM

## 2022-02-07 NOTE — TELEPHONE ENCOUNTER
"Routing refill request to provider for review/approval because:  Labs not current:  Multiple  BP not current  Patient needs to be seen because it has been more than 1 year since last office visit.    Last Written Prescription Date:  11/11/21  Last Fill Quantity: 90,  # refills: 0   Last office visit provider:  5/14/20     Requested Prescriptions   Pending Prescriptions Disp Refills     hydrochlorothiazide (HYDRODIURIL) 12.5 MG tablet [Pharmacy Med Name: HYDROCHLOROTHIAZIDE 12.5 MG TB] 90 tablet 0     Sig: TAKE 1 TABLET BY MOUTH EVERY DAY       Diuretics (Including Combos) Protocol Failed - 2/7/2022  2:40 PM        Failed - Blood pressure under 140/90 in past 12 months     BP Readings from Last 3 Encounters:   05/14/20 122/82   01/08/20 116/74   08/19/06 132/86                 Failed - Normal serum creatinine on file in past 12 months     Recent Labs   Lab Test 06/11/20  0312   CR 0.99              Failed - Normal serum potassium on file in past 12 months     Recent Labs   Lab Test 06/11/20  0312   POTASSIUM 4.5                    Failed - Normal serum sodium on file in past 12 months     Recent Labs   Lab Test 06/11/20  0312   *              Passed - Recent (12 mo) or future (30 days) visit within the authorizing provider's specialty     Patient has had an office visit with the authorizing provider or a provider within the authorizing providers department within the previous 12 mos or has a future within next 30 days. See \"Patient Info\" tab in inbasket, or \"Choose Columns\" in Meds & Orders section of the refill encounter.              Passed - Medication is active on med list        Passed - Patient is age 18 or older        Passed - No active pregancy on record        Passed - No positive pregnancy test in past 12 months             Christiano Elise RN 02/07/22 2:41 PM  "

## 2022-02-08 RX ORDER — HYDROCHLOROTHIAZIDE 12.5 MG/1
TABLET ORAL
Qty: 90 TABLET | Refills: 0 | Status: SHIPPED | OUTPATIENT
Start: 2022-02-08 | End: 2022-03-08

## 2022-03-08 ENCOUNTER — OFFICE VISIT (OUTPATIENT)
Dept: FAMILY MEDICINE | Facility: CLINIC | Age: 67
End: 2022-03-08
Payer: COMMERCIAL

## 2022-03-08 VITALS
SYSTOLIC BLOOD PRESSURE: 122 MMHG | HEIGHT: 62 IN | BODY MASS INDEX: 33.55 KG/M2 | HEART RATE: 85 BPM | DIASTOLIC BLOOD PRESSURE: 60 MMHG | WEIGHT: 182.3 LBS | TEMPERATURE: 97.8 F

## 2022-03-08 DIAGNOSIS — Z86.0100 HISTORY OF COLONIC POLYPS: ICD-10-CM

## 2022-03-08 DIAGNOSIS — R73.03 PREDIABETES: ICD-10-CM

## 2022-03-08 DIAGNOSIS — Z12.11 SCREENING FOR COLON CANCER: ICD-10-CM

## 2022-03-08 DIAGNOSIS — Z78.0 POSTMENOPAUSAL STATUS: ICD-10-CM

## 2022-03-08 DIAGNOSIS — Z00.00 ENCOUNTER FOR MEDICARE ANNUAL WELLNESS EXAM: ICD-10-CM

## 2022-03-08 DIAGNOSIS — Z00.00 MEDICARE ANNUAL WELLNESS VISIT, SUBSEQUENT: Primary | ICD-10-CM

## 2022-03-08 DIAGNOSIS — I10 ESSENTIAL (PRIMARY) HYPERTENSION: ICD-10-CM

## 2022-03-08 DIAGNOSIS — E66.3 OVERWEIGHT: ICD-10-CM

## 2022-03-08 DIAGNOSIS — D64.9 LOW HEMOGLOBIN: ICD-10-CM

## 2022-03-08 PROBLEM — D12.4 BENIGN NEOPLASM OF DESCENDING COLON: Status: ACTIVE | Noted: 2020-10-05

## 2022-03-08 LAB
ANION GAP SERPL CALCULATED.3IONS-SCNC: 14 MMOL/L (ref 5–18)
BUN SERPL-MCNC: 22 MG/DL (ref 8–22)
CALCIUM SERPL-MCNC: 9.9 MG/DL (ref 8.5–10.5)
CHLORIDE BLD-SCNC: 104 MMOL/L (ref 98–107)
CHOLEST SERPL-MCNC: 267 MG/DL
CO2 SERPL-SCNC: 23 MMOL/L (ref 22–31)
CREAT SERPL-MCNC: 0.98 MG/DL (ref 0.6–1.1)
ERYTHROCYTE [DISTWIDTH] IN BLOOD BY AUTOMATED COUNT: 12.5 % (ref 10–15)
FASTING STATUS PATIENT QL REPORTED: ABNORMAL
FERRITIN SERPL-MCNC: 54 NG/ML (ref 10–130)
GFR SERPL CREATININE-BSD FRML MDRD: 63 ML/MIN/1.73M2
GLUCOSE BLD-MCNC: 88 MG/DL (ref 70–125)
HBA1C MFR BLD: 5.8 % (ref 0–5.6)
HCT VFR BLD AUTO: 45 % (ref 35–47)
HDLC SERPL-MCNC: 58 MG/DL
HGB BLD-MCNC: 14.9 G/DL (ref 11.7–15.7)
LDLC SERPL CALC-MCNC: 179 MG/DL
MCH RBC QN AUTO: 29.1 PG (ref 26.5–33)
MCHC RBC AUTO-ENTMCNC: 33.1 G/DL (ref 31.5–36.5)
MCV RBC AUTO: 88 FL (ref 78–100)
PLATELET # BLD AUTO: 288 10E3/UL (ref 150–450)
POTASSIUM BLD-SCNC: 4 MMOL/L (ref 3.5–5)
RBC # BLD AUTO: 5.12 10E6/UL (ref 3.8–5.2)
SODIUM SERPL-SCNC: 141 MMOL/L (ref 136–145)
TRIGL SERPL-MCNC: 149 MG/DL
WBC # BLD AUTO: 5.7 10E3/UL (ref 4–11)

## 2022-03-08 PROCEDURE — 83036 HEMOGLOBIN GLYCOSYLATED A1C: CPT | Performed by: FAMILY MEDICINE

## 2022-03-08 PROCEDURE — 99397 PER PM REEVAL EST PAT 65+ YR: CPT | Mod: 25 | Performed by: FAMILY MEDICINE

## 2022-03-08 PROCEDURE — 80061 LIPID PANEL: CPT | Performed by: FAMILY MEDICINE

## 2022-03-08 PROCEDURE — 90732 PPSV23 VACC 2 YRS+ SUBQ/IM: CPT | Performed by: FAMILY MEDICINE

## 2022-03-08 PROCEDURE — 80048 BASIC METABOLIC PNL TOTAL CA: CPT | Performed by: FAMILY MEDICINE

## 2022-03-08 PROCEDURE — G0009 ADMIN PNEUMOCOCCAL VACCINE: HCPCS | Performed by: FAMILY MEDICINE

## 2022-03-08 PROCEDURE — 85027 COMPLETE CBC AUTOMATED: CPT | Performed by: FAMILY MEDICINE

## 2022-03-08 PROCEDURE — 82728 ASSAY OF FERRITIN: CPT | Performed by: FAMILY MEDICINE

## 2022-03-08 PROCEDURE — 36415 COLL VENOUS BLD VENIPUNCTURE: CPT | Performed by: FAMILY MEDICINE

## 2022-03-08 RX ORDER — HYDROCHLOROTHIAZIDE 12.5 MG/1
12.5 TABLET ORAL DAILY
Qty: 90 TABLET | Refills: 3 | Status: SHIPPED | OUTPATIENT
Start: 2022-03-08 | End: 2023-01-06

## 2022-03-08 ASSESSMENT — ACTIVITIES OF DAILY LIVING (ADL): CURRENT_FUNCTION: NO ASSISTANCE NEEDED

## 2022-03-08 NOTE — PATIENT INSTRUCTIONS
You can call the radiology department at [phone] to schedule your mammogram    Patient Education   Personalized Prevention Plan  You are due for the preventive services outlined below.  Your care team is available to assist you in scheduling these services.  If you have already completed any of these items, please share that information with your care team to update in your medical record.  Health Maintenance Due   Topic Date Due     Osteoporosis Screening  Never done     ANNUAL REVIEW OF HM ORDERS  Never done     FALL RISK ASSESSMENT  01/08/2021     Flu Vaccine (1) Never done     Colorectal Cancer Screening  10/01/2021     Mammogram  02/05/2022       Understanding USDA MyPlate  The USDA has guidelines to help you make healthy food choices. These are called MyPlate. MyPlate shows the food groups that make up healthy meals using the image of a place setting. Before you eat, think about the healthiest choices for what to put on your plate or in your cup or bowl. To learn more about building a healthy plate, visit www.choosemyplate.gov.    The food groups    Fruits. Any fruit or 100% fruit juice counts as part of the Fruit Group. Fruits may be fresh, canned, frozen, or dried, and may be whole, cut-up, or pureed. Make 1/2 of your plate fruits and vegetables.    Vegetables. Any vegetable or 100% vegetable juice counts as a member of the Vegetable Group. Vegetables may be fresh, frozen, canned, or dried. They can be served raw or cooked and may be whole, cut-up, or mashed. Make 1/2 of your plate fruits and vegetables.    Grains. All foods made from grains are part of the Grains Group. These include wheat, rice, oats, cornmeal, and barley. Grains are often used to make foods such as bread, pasta, oatmeal, cereal, tortillas, and grits. Grains should be no more than 1/4 of your plate. At least half of your grains should be whole grains.    Protein. This group includes meat, poultry, seafood, beans and peas, eggs,  processed soy products (such as tofu), nuts (including nut butters), and seeds. Make protein choices no more than 1/4 of your plate. Meat and poultry choices should be lean or low fat.    Dairy. The Dairy Group includes all fluid milk products and foods made from milk that contain calcium, such as yogurt and cheese. (Foods that have little calcium, such as cream, butter, and cream cheese, are not part of this group.) Most dairy choices should be low-fat or fat-free.    Oils. Oils aren't a food group, but they do contain essential nutrients. However it's important to watch your intake of oils. These are fats that are liquid at room temperature. They include canola, corn, olive, soybean, vegetable, and sunflower oil. Foods that are mainly oil include mayonnaise, certain salad dressings, and soft margarines. You likely already get your daily oil allowance from the foods you eat.  Things to limit  Eating healthy also means limiting these things in your diet:       Salt (sodium). Many processed foods have a lot of sodium. To keep sodium intake down, eat fresh vegetables, meats, poultry, and seafood when possible. Purchase low-sodium, reduced-sodium, or no-salt-added food products at the store. And don't add salt to your meals at home. Instead, season them with herbs and spices such as dill, oregano, cumin, and paprika. Or try adding flavor with lemon or lime zest and juice.    Saturated fat. Saturated fats are most often found in animal products such as beef, pork, and chicken. They are often solid at room temperature, such as butter. To reduce your saturated fat intake, choose leaner cuts of meat and poultry. And try healthier cooking methods such as grilling, broiling, roasting, or baking. For a simple lower-fat swap, use plain nonfat yogurt instead of mayonnaise when making potato salad or macaroni salad.    Added sugars. These are sugars added to foods. They are in foods such as ice cream, candy, soda, fruit drinks,  sports drinks, energy drinks, cookies, pastries, jams, and syrups. Cut down on added sugars by sharing sweet treats with a family member or friend. You can also choose fruit for dessert, and drink water or other unsweetened beverages.     [employer] last reviewed this educational content on 6/1/2020 2000-2021 [employer]. All rights reserved. This information is not intended as a substitute for professional medical care. Always follow your healthcare professional's instructions.

## 2022-03-08 NOTE — PROGRESS NOTES
"SUBJECTIVE:   Glenys Garcia is a 66 year old female who presents for Preventive Visit.    Patient has been advised of split billing requirements and indicates understanding: Yes      Healthy Habits:     In general, how would you rate your overall health?  Good    Frequency of exercise:  2-3 days/week    Duration of exercise:  15-30 minutes    Do you usually eat at least 4 servings of fruit and vegetables a day, include whole grains    & fiber and avoid regularly eating high fat or \"junk\" foods?  No    Taking medications regularly:  Yes    Medication side effects:  None    Ability to successfully perform activities of daily living:  No assistance needed    Home Safety:  No safety concerns identified    Hearing Impairment:  No hearing concerns    In the past 6 months, have you been bothered by leaking of urine?  No    In general, how would you rate your overall mental or emotional health?  Excellent      PHQ-2 Total Score: 0    Additional concerns today:  No    Chief Complaint   Patient presents with     Wellness Visit     Enjoyed a [employer] [place] recently to [place] recently.    She had the 3 dose covid series.    HTN: home BPs are 120-132 systolic/80s diastolic.     She didn't have a good experience at her recent colonoscopy; had issues with billing.  She was told to come back in 1 year due to poor prep.  She doesn't want her colonoscopy repeated until next year (was done 10/2020)  We reviewed it was a sessile polyp with no high grade dysplasia.     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 . 2 children, 4 grandchildren; all live in the area. 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 Nonsmoker; no alcohol.   Amy Donaldson MD           Do you feel safe in your environment? Yes    Have you ever done Advance Care Planning? (For example, a Health Directive, POLST, or a discussion with a medical " provider or your loved ones about your wishes): Yes, advance care planning is on file.       Fall risk  Fallen 2 or more times in the past year?: No  Any fall with injury in the past year?: No    Cognitive Screening   1) Repeat 3 items (Leader, Season, Table)    2) Clock draw: NORMAL  3) 3 item recall: Recalls 3 objects  Results: NORMAL clock, 1-2 items recalled: COGNITIVE IMPAIRMENT LESS LIKELY    Mini-CogTM Copyright ALBERT Ivey. Licensed by the author for use in [facility]; reprinted with permission (yana@[email or web address]). All rights reserved.      Do you have sleep apnea, excessive snoring or daytime drowsiness?: no    Reviewed and updated as needed this visit by clinical staff   Tobacco  Allergies  Meds  Problems             Reviewed and updated as needed this visit by Provider    Allergies  Meds  Problems            Social History     Tobacco Use     Smoking status: Never Smoker     Smokeless tobacco: Never Used   Substance Use Topics     Alcohol use: Yes     Comment: Alcoholic Drinks/day: 2 drinks/month     If you drink alcohol do you typically have >3 drinks per day or >7 drinks per week? No    Alcohol Use 3/8/2022   Prescreen: >3 drinks/day or >7 drinks/week? No   Prescreen: >3 drinks/day or >7 drinks/week? -   No flowsheet data found.      Current providers sharing in care for this patient include:   Patient Care Team:  Amy Donaldson MD as PCP - General (Family Practice)  Amy Donaldson MD as Assigned PCP    The following health maintenance items are reviewed in Epic and correct as of today:  Health Maintenance Due   Topic Date Due     DEXA  Never done     MICROALBUMIN  Never done     DIABETIC FOOT EXAM  Never done     ANNUAL REVIEW OF HM ORDERS  Never done     EYE EXAM  Never done     A1C  08/14/2020     LIPID  01/08/2021     FALL RISK ASSESSMENT  01/08/2021     Pneumococcal Vaccine: 65+ Years (1 of 1 - PPSV23) 01/08/2021     BMP  06/11/2021     INFLUENZA VACCINE (1) Never  "done     COLORECTAL CANCER SCREENING  10/01/2021     MAMMO SCREENING  02/05/2022     Lab work is in process  Pneumonia Vaccine: needs PPSV23 today  Mammogram Screening: Mammogram Screening: Recommended mammography every 1-2 years with patient discussion and risk factor consideration    Breast CA Risk Assessment (FHS-7) 3/8/2022   Do you have a family history of breast, colon, or ovarian cancer? No / Unknown     Pertinent mammograms are reviewed under the imaging tab. Last done 2/2020; opted for 2 yr follow up and encouraged to schedule     Review of Systems  Constitutional, HEENT, cardiovascular, pulmonary, gi and gu systems are negative, except as otherwise noted.    OBJECTIVE:   /60 (BP Location: Left arm, Patient Position: Sitting, Cuff Size: Adult Large)   Pulse 85   Temp 97.8  F (36.6  C) (Oral)   Ht 1.562 m (5' 1.5\")   Wt 82.7 kg (182 lb 4.8 oz)   BMI 33.89 kg/m   Estimated body mass index is 33.89 kg/m  as calculated from the following:    Height as of this encounter: 1.562 m (5' 1.5\").    Weight as of this encounter: 82.7 kg (182 lb 4.8 oz).  Physical Exam  GENERAL: healthy, alert and no distress  EYES: Eyes grossly normal to inspection, PERRL and conjunctivae and sclerae normal  HENT: ear canals and TM's normal, nose and mouth without ulcers or lesions  NECK: no adenopathy, no asymmetry, masses, or scars and thyroid normal to palpation  RESP: lungs clear to auscultation - no rales, rhonchi or wheezes  BREAST: pt declined  CV: regular rate and rhythm, normal S1 S2, no S3 or S4, no murmur, click or rub, no peripheral edema and peripheral pulses strong  ABDOMEN: soft, nontender, no hepatosplenomegaly, no masses and bowel sounds normal  MS: no gross musculoskeletal defects noted, no edema  SKIN: no suspicious lesions or rashes  NEURO: Normal strength and tone, mentation intact and speech normal  PSYCH: mentation appears normal, affect normal/bright    Diagnostic Test Results:  Labs reviewed in " Epic    ASSESSMENT / PLAN:   Glenys was seen today for wellness visit.    Diagnoses and all orders for this visit:    Medicare annual wellness visit, subsequent  - Home safety information was reviewed if pertinent for falls prevention: regular checkups with vision and hearing, mindful medication use elke with OTCs, using any assisted walking devices, adequate shoes and feet wear, avoiding loose rugs, safety additions to the home if needed, keeping the body in good shape with regular exercise especially walking, and limiting alcohol intake.  - Social history was reviewed  - Patient is independent with activities of daily living  - We reviewed active symptoms and discussed management  - We reviewed list of healthcare providers for this patient.  - We also reviewed PHQ 9    Mammogram Last done 2/2020; opted for 2 yr follow up and encouraged to schedule     -     Lipid panel reflex to direct LDL Fasting; Future  -     CBC with platelets; Future  -     Hemoglobin A1c; Future  -     Basic metabolic panel  (Ca, Cl, CO2, Creat, Gluc, K, Na, BUN); Future    History of colonic polyps  -     Adult Gastro Ref - Procedure Only; Future: pt declined; risk benefit conversation held elke in light of previously low Hb when checked at her last visit in 2020    Essential (primary) hypertension  Well controlled with home BPs 120s/80s. BMP due. Refilled hydrochlorothiazide.   -     CBC with platelets; Future  -     Basic metabolic panel  (Ca, Cl, CO2, Creat, Gluc, K, Na, BUN); Future  -     hydrochlorothiazide (HYDRODIURIL) 12.5 MG tablet; Take 1 tablet (12.5 mg) by mouth daily    Prediabetes  Never had a dx of diabetes so we removed that from the chart.   A1c always 5.7-5.8%, not needing/wanting metformin.   Diet/exercise reviewed  -     Hemoglobin A1c; Future    Overweight  Risks of obesity were discussed, including co-morbidities such as diabetes, HTN, HLD, CAD and stroke.   - encouraged moderate physical activity   - encouraged healthy  "dietary choices like eating real foods, increasing protein & vegetables, and watching portion sizes.    Postmenopausal status  -     DX Hip/Pelvis/Spine; Future      Low hemoglobin  Getting full CBC and ferritin today; advised colonoscopy as well which she declines  -     Ferritin; Future    Other orders  -     PPSV23, IM/SUBQ (2+ YRS) - [id number]        Patient has been advised of split billing requirements and indicates understanding: Yes    COUNSELING:  Reviewed preventive health counseling, as reflected in patient instructions    Estimated body mass index is 33.89 kg/m  as calculated from the following:    Height as of this encounter: 1.562 m (5' 1.5\").    Weight as of this encounter: 82.7 kg (182 lb 4.8 oz).    Weight management plan: Discussed healthy diet and exercise guidelines    She reports that she has never smoked. She has never used smokeless tobacco.      Appropriate preventive services were discussed with this patient, including applicable screening as appropriate for cardiovascular disease, diabetes, osteopenia/osteoporosis, and glaucoma.  As appropriate for age/gender, discussed screening for colorectal cancer, prostate cancer, breast cancer, and cervical cancer. Checklist reviewing preventive services available has been given to the patient.    Reviewed patients plan of care and provided an AVS. The Basic Care Plan (routine screening as documented in Health Maintenance) for Glenys meets the Care Plan requirement. This Care Plan has been established and reviewed with the Patient.    Counseling Resources:  ATP IV Guidelines  Pooled Cohorts Equation Calculator  Breast Cancer Risk Calculator  Breast Cancer: Medication to Reduce Risk  FRAX Risk Assessment  ICSI Preventive Guidelines  Dietary Guidelines for Americans, 2010  USDA's MyPlate  ASA Prophylaxis  Lung CA Screening    Amy Donaldson MD  [facility]    Identified Health Risks:    The patient was counseled " and encouraged to consider modifying their diet and eating habits. She was provided with information on recommended healthy diet options.

## 2022-05-07 ENCOUNTER — HEALTH MAINTENANCE LETTER (OUTPATIENT)
Age: 67
End: 2022-05-07

## 2022-05-24 ENCOUNTER — TRANSFERRED RECORDS (OUTPATIENT)
Dept: HEALTH INFORMATION MANAGEMENT | Facility: CLINIC | Age: 67
End: 2022-05-24
Payer: COMMERCIAL

## 2022-10-03 ENCOUNTER — TRANSFERRED RECORDS (OUTPATIENT)
Dept: HEALTH INFORMATION MANAGEMENT | Facility: CLINIC | Age: 67
End: 2022-10-03

## 2022-10-11 ENCOUNTER — TRANSFERRED RECORDS (OUTPATIENT)
Dept: HEALTH INFORMATION MANAGEMENT | Facility: CLINIC | Age: 67
End: 2022-10-11

## 2022-11-08 ENCOUNTER — TRANSFERRED RECORDS (OUTPATIENT)
Dept: HEALTH INFORMATION MANAGEMENT | Facility: CLINIC | Age: 67
End: 2022-11-08

## 2022-11-11 ENCOUNTER — HOSPITAL ENCOUNTER (OUTPATIENT)
Dept: MAMMOGRAPHY | Facility: CLINIC | Age: 67
Discharge: HOME | End: 2022-11-11
Attending: FAMILY MEDICINE | Admitting: FAMILY MEDICINE
Payer: COMMERCIAL

## 2022-11-11 DIAGNOSIS — Z12.31 VISIT FOR SCREENING MAMMOGRAM: ICD-10-CM

## 2022-11-11 PROCEDURE — 77067 SCR MAMMO BI INCL CAD: CPT

## 2022-12-26 ENCOUNTER — HEALTH MAINTENANCE LETTER (OUTPATIENT)
Age: 67
End: 2022-12-26

## 2023-01-06 ENCOUNTER — OFFICE VISIT (OUTPATIENT)
Dept: FAMILY MEDICINE | Facility: CLINIC | Age: 68
End: 2023-01-06
Payer: COMMERCIAL

## 2023-01-06 VITALS
SYSTOLIC BLOOD PRESSURE: 120 MMHG | DIASTOLIC BLOOD PRESSURE: 86 MMHG | HEIGHT: 61 IN | BODY MASS INDEX: 34.53 KG/M2 | HEART RATE: 104 BPM | WEIGHT: 182.9 LBS

## 2023-01-06 DIAGNOSIS — R73.03 PREDIABETES: ICD-10-CM

## 2023-01-06 DIAGNOSIS — E66.09 CLASS 1 OBESITY DUE TO EXCESS CALORIES WITH SERIOUS COMORBIDITY AND BODY MASS INDEX (BMI) OF 34.0 TO 34.9 IN ADULT: ICD-10-CM

## 2023-01-06 DIAGNOSIS — I10 ESSENTIAL (PRIMARY) HYPERTENSION: ICD-10-CM

## 2023-01-06 DIAGNOSIS — Z00.00 ENCOUNTER FOR MEDICARE ANNUAL WELLNESS EXAM: Primary | ICD-10-CM

## 2023-01-06 DIAGNOSIS — E66.811 CLASS 1 OBESITY DUE TO EXCESS CALORIES WITH SERIOUS COMORBIDITY AND BODY MASS INDEX (BMI) OF 34.0 TO 34.9 IN ADULT: ICD-10-CM

## 2023-01-06 DIAGNOSIS — Z12.11 SCREEN FOR COLON CANCER: ICD-10-CM

## 2023-01-06 DIAGNOSIS — Z78.0 POST-MENOPAUSAL: ICD-10-CM

## 2023-01-06 DIAGNOSIS — Z12.11 SCREENING FOR COLON CANCER: ICD-10-CM

## 2023-01-06 PROBLEM — E66.3 OVERWEIGHT: Status: RESOLVED | Noted: 2019-01-02 | Resolved: 2023-01-06

## 2023-01-06 LAB
ANION GAP SERPL CALCULATED.3IONS-SCNC: 17 MMOL/L (ref 7–15)
BUN SERPL-MCNC: 22.8 MG/DL (ref 8–23)
CALCIUM SERPL-MCNC: 10.7 MG/DL (ref 8.8–10.2)
CHLORIDE SERPL-SCNC: 102 MMOL/L (ref 98–107)
CHOLEST SERPL-MCNC: 281 MG/DL
CREAT SERPL-MCNC: 1.06 MG/DL (ref 0.51–0.95)
DEPRECATED HCO3 PLAS-SCNC: 22 MMOL/L (ref 22–29)
GFR SERPL CREATININE-BSD FRML MDRD: 57 ML/MIN/1.73M2
GLUCOSE SERPL-MCNC: 95 MG/DL (ref 70–99)
HBA1C MFR BLD: 5.9 % (ref 0–5.6)
HDLC SERPL-MCNC: 58 MG/DL
HGB BLD-MCNC: 15.3 G/DL (ref 11.7–15.7)
LDLC SERPL CALC-MCNC: 189 MG/DL
NONHDLC SERPL-MCNC: 223 MG/DL
POTASSIUM SERPL-SCNC: 3.8 MMOL/L (ref 3.4–5.3)
SODIUM SERPL-SCNC: 141 MMOL/L (ref 136–145)
TRIGL SERPL-MCNC: 169 MG/DL

## 2023-01-06 PROCEDURE — 99214 OFFICE O/P EST MOD 30 MIN: CPT | Mod: 25 | Performed by: FAMILY MEDICINE

## 2023-01-06 PROCEDURE — 80061 LIPID PANEL: CPT | Performed by: FAMILY MEDICINE

## 2023-01-06 PROCEDURE — 36415 COLL VENOUS BLD VENIPUNCTURE: CPT | Performed by: FAMILY MEDICINE

## 2023-01-06 PROCEDURE — 80048 BASIC METABOLIC PNL TOTAL CA: CPT | Performed by: FAMILY MEDICINE

## 2023-01-06 PROCEDURE — G0439 PPPS, SUBSEQ VISIT: HCPCS | Performed by: FAMILY MEDICINE

## 2023-01-06 PROCEDURE — 83036 HEMOGLOBIN GLYCOSYLATED A1C: CPT | Performed by: FAMILY MEDICINE

## 2023-01-06 PROCEDURE — 85018 HEMOGLOBIN: CPT | Performed by: FAMILY MEDICINE

## 2023-01-06 RX ORDER — HYDROCHLOROTHIAZIDE 12.5 MG/1
12.5 TABLET ORAL DAILY
Qty: 90 TABLET | Refills: 3 | Status: SHIPPED | OUTPATIENT
Start: 2023-01-06 | End: 2024-01-11

## 2023-01-06 ASSESSMENT — ENCOUNTER SYMPTOMS
NERVOUS/ANXIOUS: 0
PALPITATIONS: 0
HEADACHES: 0
PARESTHESIAS: 0
DIZZINESS: 0
CONSTIPATION: 0
NAUSEA: 0
FEVER: 0
DYSURIA: 0
DIARRHEA: 0
SHORTNESS OF BREATH: 0
BREAST MASS: 0
HEARTBURN: 0
ARTHRALGIAS: 0
WEAKNESS: 0
SORE THROAT: 0
HEMATOCHEZIA: 0
EYE PAIN: 0
COUGH: 0
JOINT SWELLING: 0
FREQUENCY: 0
CHILLS: 0
ABDOMINAL PAIN: 0
HEMATURIA: 0
MYALGIAS: 1

## 2023-01-06 ASSESSMENT — ACTIVITIES OF DAILY LIVING (ADL): CURRENT_FUNCTION: NO ASSISTANCE NEEDED

## 2023-01-06 NOTE — PROGRESS NOTES
"SUBJECTIVE:   Ebonie is a 67 year old who presents for Preventive Visit.  Patient has been advised of split billing requirements and indicates understanding: Yes  Are you in the first 12 months of your Medicare coverage?  No    Healthy Habits:     In general, how would you rate your overall health?  Good    Frequency of exercise:  2-3 days/week    Duration of exercise:  15-30 minutes    Do you usually eat at least 4 servings of fruit and vegetables a day, include whole grains    & fiber and avoid regularly eating high fat or \"junk\" foods?  Yes    Taking medications regularly:  Yes    Medication side effects:  None    Ability to successfully perform activities of daily living:  No assistance needed    Home Safety:  No safety concerns identified    Hearing Impairment:  No hearing concerns    In the past 6 months, have you been bothered by leaking of urine?  No    In general, how would you rate your overall mental or emotional health?  Good      PHQ-2 Total Score: 0    Additional concerns today:  No    Chief Complaint   Patient presents with     Wellness Visit     fasting     HTN; on hydrochlorothiazide, BP in clinic 138/92; \"frusterated from TV not working this morning\"; home BPs are 118/87 range. Diastolics always in the upper 80s since her COVID weight gain.     BMI 34. Not doing anything specific to focus on her weight but she is interested now. Eating out with other couples about 1-2x/week. Eating a lot more carbs at meals; generally not snacking but eating 2x/day most of the time for main meals. We talked through this in more detail today; with emphasis to change her breakfasts (toast/yogurt) and walk more.     Colonoscopy 10/1/2020- follow up 1 year, sessile polyp/poor prep- declined sooner f/u than 3yr    Wt Readings from Last 3 Encounters:   01/06/23 83 kg (182 lb 14.4 oz)   03/08/22 82.7 kg (182 lb 4.8 oz)   06/10/20 70.8 kg (156 lb)       Social History     Social History Narrative    Living in a [facility]. " . 2 children, 4 grandchildren; all live in the area. 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 Nonsmoker; no alcohol.   Amy Donaldson MD         Have you ever done Advance Care Planning? (For example, a Health Directive, POLST, or a discussion with a medical provider or your loved ones about your wishes): No, advance care planning information given to patient to review.  Patient plans to discuss their wishes with loved ones or provider.         Fall risk  Fallen 2 or more times in the past year?: No  Any fall with injury in the past year?: No    Cognitive Screening   1) Repeat 3 items (Leader, Season, Table)    2) Clock draw: NORMAL  3) 3 item recall: Recalls 3 objects  Results: 3 items recalled: COGNITIVE IMPAIRMENT LESS LIKELY    Mini-CogTM Copyright S Loni. Licensed by the author for use in [facility]; reprinted with permission (yana@[email or web address]). All rights reserved.          Reviewed and updated as needed this visit by clinical staff   Tobacco  Allergies  Meds  Problems             Reviewed and updated as needed this visit by Provider    Allergies  Meds  Problems            Social History     Tobacco Use     Smoking status: Never     Smokeless tobacco: Never   Substance Use Topics     Alcohol use: Yes     Comment: Alcoholic Drinks/day: 2 drinks/month     If you drink alcohol do you typically have >3 drinks per day or >7 drinks per week? No    Alcohol Use 1/6/2023   Prescreen: >3 drinks/day or >7 drinks/week? No   Prescreen: >3 drinks/day or >7 drinks/week? -   No flowsheet data found.      Current providers sharing in care for this patient include:  Patient Care Team:  Amy Donaldson MD as PCP - General (Family Practice)  Amy Donaldson MD as Assigned PCP    The following health maintenance items are reviewed in Epic and correct as of today:  Health Maintenance   Topic Date Due     DEXA  Never done     COLORECTAL CANCER SCREENING  10/01/2021      "INFLUENZA VACCINE (1) Never done     DTAP/TDAP/TD IMMUNIZATION (3 - Td or Tdap) 07/16/2023     MEDICARE ANNUAL WELLNESS VISIT  01/06/2024     ANNUAL REVIEW OF HM ORDERS  01/06/2024     FALL RISK ASSESSMENT  01/06/2024     MAMMO SCREENING  11/11/2024     LIPID  03/08/2027     ADVANCE CARE PLANNING  03/08/2027     PHQ-2 (once per calendar year)  Completed     Pneumococcal Vaccine: 65+ Years  Completed     ZOSTER IMMUNIZATION  Completed     COVID-19 Vaccine  Completed     IPV IMMUNIZATION  Aged Out     MENINGITIS IMMUNIZATION  Aged Out     HEPATITIS C SCREENING  Discontinued     Breast CA Risk Assessment (FHS-7) 3/8/2022   Do you have a family history of breast, colon, or ovarian cancer? No / Unknown     Pertinent mammograms are reviewed under the imaging tab.    Review of Systems   Constitutional: Negative for chills and fever.   HENT: Negative for congestion, ear pain, hearing loss and sore throat.    Eyes: Negative for pain and visual disturbance.   Respiratory: Negative for cough and shortness of breath.    Cardiovascular: Negative for chest pain, palpitations and peripheral edema.   Gastrointestinal: Negative for abdominal pain, constipation, diarrhea, heartburn, hematochezia and nausea.   Breasts:  Negative for tenderness, breast mass and discharge.   Genitourinary: Negative for dysuria, frequency, hematuria, pelvic pain, urgency, vaginal bleeding and vaginal discharge.   Musculoskeletal: Positive for myalgias. Negative for arthralgias and joint swelling.   Skin: Negative for rash.   Neurological: Negative for dizziness, weakness, headaches and paresthesias.   Psychiatric/Behavioral: The patient is not nervous/anxious.      OBJECTIVE:   /86   Pulse 104   Ht 1.556 m (5' 1.25\")   Wt 83 kg (182 lb 14.4 oz)   BMI 34.28 kg/m   Estimated body mass index is 34.28 kg/m  as calculated from the following:    Height as of this encounter: 1.556 m (5' 1.25\").    Weight as of this encounter: 83 kg (182 lb 14.4 " oz).  Physical Exam  GENERAL: healthy, alert and no distress  EYES: Eyes grossly normal to inspection, PERRL and conjunctivae and sclerae normal  HENT: ear canals and TM's normal, nose and mouth without ulcers or lesions  NECK: no adenopathy, no asymmetry, masses, or scars and thyroid normal to palpation  RESP: lungs clear to auscultation - no rales, rhonchi or wheezes  CV: regular rate and rhythm, normal S1 S2, no S3 or S4, no murmur, click or rub, no peripheral edema and peripheral pulses strong  ABDOMEN: soft, nontender, no hepatosplenomegaly, no masses and bowel sounds normal  MS: no gross musculoskeletal defects noted, no edema  SKIN: no suspicious lesions or rashes  NEURO: Normal strength and tone, mentation intact and speech normal  PSYCH: mentation appears normal, affect normal/bright    Diagnostic Test Results:  Labs reviewed in Epic    ASSESSMENT / PLAN:   Glenys was seen today for wellness visit.    Diagnoses and all orders for this visit:    Encounter for Medicare annual wellness exam  - Home safety information was reviewed if pertinent for falls prevention: regular checkups with vision and hearing, mindful medication use elke with OTCs, using any assisted walking devices, adequate shoes and feet wear, avoiding loose rugs, safety additions to the home if needed, keeping the body in good shape with regular exercise especially walking, and limiting alcohol intake.  - Social history was reviewed  - Patient is independent with activities of daily living  - We reviewed active symptoms and discussed management  - We reviewed list of healthcare providers for this patient.  - We also reviewed PHQ 9  -     Lipid Profile  -     Hemoglobin A1c  -     Basic metabolic panel  -     Hemoglobin    Essential (primary) hypertension  BP well controlled but diastolics have room for improvement in the upper 80s. Continue low dose hydrochlorothiazide and priority placed on weight loss with healthy lifestyle changes. Low salt  "diet, exercise and increasing healthy foods, decreasing fast foods etc.   -     hydrochlorothiazide (HYDRODIURIL) 12.5 MG tablet; Take 1 tablet (12.5 mg) by mouth daily    Prediabetes  BMI 34, obesity with serious comorbidity (HTN)  Risks of obesity were discussed, including co-morbidities such as diabetes, HTN, HLD, CAD and stroke.   - encouraged moderate physical activity of 150 minutes per week  - encouraged healthy dietary choices like eating real foods, increasing protein & vegetables, and watching portion sizes.    -     Hemoglobin A1c    Screening for colon cancer  Overdue for 2020 colonoscopy (sessile polyp/poor prep) and declined sooner than 3yr follow up  -     Colonoscopy Screening  Referral; Future    Post-menopausal  -     DX Hip/Pelvis/Spine; Future    Other orders  -     REVIEW OF HEALTH MAINTENANCE PROTOCOL ORDERS        Patient has been advised of split billing requirements and indicates understanding: Yes      COUNSELING:  Reviewed preventive health counseling, as reflected in patient instructions      BMI:   Estimated body mass index is 34.28 kg/m  as calculated from the following:    Height as of this encounter: 1.556 m (5' 1.25\").    Weight as of this encounter: 83 kg (182 lb 14.4 oz).   Weight management plan: Discussed healthy diet and exercise guidelines      She reports that she has never smoked. She has never used smokeless tobacco.      Appropriate preventive services were discussed with this patient, including applicable screening as appropriate for cardiovascular disease, diabetes, osteopenia/osteoporosis, and glaucoma.  As appropriate for age/gender, discussed screening for colorectal cancer, prostate cancer, breast cancer, and cervical cancer. Checklist reviewing preventive services available has been given to the patient.    Reviewed patients plan of care and provided an AVS. The Basic Care Plan (routine screening as documented in Health Maintenance) for Glenys meets the Care " Plan requirement. This Care Plan has been established and reviewed with the Patient.      Amy Donaldson MD  [facility]    Identified Health Risks:

## 2023-01-06 NOTE — PATIENT INSTRUCTIONS
"Dr. Polina Lyon's book for everyone:  \"How to Loose Weight for the Last Time: Brain Based Solutions for Permanent Weight Loss\"   Also her Podcast: Weight loss for Busy Physicians *   *just insert your own career/barriers with any reference to busy physician world and you will still get phenomenal tips for the mindset changes needed for sustained weight loss.        Weight Loss Strategies for Success: (start small, pick 1 or 2 goals each week)    Stop snacking. Three meals a day only. This is THE MAJOR  for insulin resistance. We need to change the insulin resistance to have sustained weight loss.   Keep a food log- apps like [employer] are very helpful for this. (Not to pay attention to the calories but just to track the food).   Plan your food log in advance- Night prior: pick out what you want to eat for the next day for your 3 meals and stick to it: protein, healthy fat, veggie for example at every meal.  High protein (60g/day): Try to get protein in at least every 3.5 hours during the day to avoid a hunger surge late in the day.  If hungry, start with a protein serving, followed by water and then a crunchy vegetable that requires chewing (this decreases the hunger hormone).   Use plenty of healthy fats (olive oil, avocados, nuts) when cooking which will make you feel more satisfied.    Be intentional and think about what you are eating and feel the food fuel you.  Have a  \"Table, chair, plate\" with every meal. Sit down to eat your food!  Brush your teeth after the last meal of the day. Prevents evening snacking.   Avoid sugary beverages. (pop, fancy coffees). Reduce alcohol.   Drink water instead.   No fast food (or reduce meals out to a specific #/week). Eat at home 90% of the time.  Start the morning with breakfast.  Choose one day to be \"meal planning\" and/or \"meal prep\" days. Plan ahead for grocery shopping for healthy food choices, and spend 30min-1hr each week prepping your fruits/veggies (wash, " chop, store in easy grab portions).  Make it easy to grab the protein (cut up chicken breasts, greek yogurt containers, hard boiled eggs, etc)  Start low intensity exercise 30 minutes/day (walking/hiking/yard work).  Goal for 10,000 steps per day (consider a FitBit or other tracking device).  Daily weights can help- but just use it as a data point; don't get stuck in the thoughts around that number.         Patient Education   Personalized Prevention Plan  You are due for the preventive services outlined below.  Your care team is available to assist you in scheduling these services.  If you have already completed any of these items, please share that information with your care team to update in your medical record.  Health Maintenance Due   Topic Date Due    Osteoporosis Screening  Never done    ANNUAL REVIEW OF HM ORDERS  Never done    Colorectal Cancer Screening  10/01/2021    Flu Vaccine (1) Never done

## 2023-01-06 NOTE — PROGRESS NOTES
[facility]  [address] [place] [id number]  Phone: [phone]  Fax: [phone]  Primary Provider: Amy Donaldson  Pre-op Performing Provider: AMY DONALDSON    {Provider  Link to PREOP SmartSet  Use this to apply standard patient instructions to AVS; includes medication directions, common orders, guidelines for anemia, warfarin, additional testing   :341275}  PREOPERATIVE EVALUATION:  Today's date: 1/6/2023    Glenys Garcia is a 67 year old female who presents for a preoperative evaluation.    Surgical Information:  Surgery/Procedure: ***  Surgery Location: ***  Surgeon: ***  Surgery Date: ***  Time of Surgery: ***  Where patient plans to recover: {Preop post recovery plans :035701}  Fax number for surgical facility: {SURGERY FAX NUMBER:[id number]}    Type of Anesthesia Anticipated: {ANESTHESIA:738479}    {2021 Provider Charting Preference for Preop :040830}    Subjective     HPI related to upcoming procedure: ***    {Click here to pull in Questionnaire Data after Qnr completed :407960}  Health Care Directive:  Patient does not have a Health Care Directive or Living Will: {ADVANCE_DIRECTIVE_STATUS:899913}    Preoperative Review of :  {Mnpmpreport:427940}  {Review MNPMP for all patients per ICSI MNPMP Profile:757510}    {Chronic problem details (Optional) :624028}    Review of Systems   Constitutional: Negative for chills and fever.   HENT: Negative for congestion, ear pain, hearing loss and sore throat.    Eyes: Negative for pain and visual disturbance.   Respiratory: Negative for cough and shortness of breath.    Cardiovascular: Negative for chest pain, palpitations and peripheral edema.   Gastrointestinal: Negative for abdominal pain, constipation, diarrhea, heartburn, hematochezia and nausea.   Breasts:  Negative for tenderness, breast mass and discharge.   Genitourinary: Negative for dysuria, frequency, hematuria, pelvic pain, urgency, vaginal  bleeding and vaginal discharge.   Musculoskeletal: Positive for myalgias. Negative for arthralgias and joint swelling.   Skin: Negative for rash.   Neurological: Negative for dizziness, weakness, headaches and paresthesias.   Psychiatric/Behavioral: The patient is not nervous/anxious.      {ROS Preop Choices:440917}    Patient Active Problem List    Diagnosis Date Noted     Benign neoplasm of descending colon 10/05/2020     Priority: Medium     Status post total replacement of left hip 06/11/2020     Priority: Medium     Essential hypertension, benign      Priority: Medium     Primary osteoarthritis of left hip 06/10/2020     Priority: Medium     Prediabetes 01/08/2020     Priority: Medium     A1c 5.8% 1/2020 (this reflects a 45lb weight loss in prior year).          Overweight 01/02/2019     Priority: Medium     Diverticular disease of colon 06/10/2010     Priority: Medium      Past Medical History:   Diagnosis Date     Arthritis      Hypertension      Type 2 diabetes mellitus treated without insulin (H)      Past Surgical History:   Procedure Laterality Date     ZZC TOTAL HIP ARTHROPLASTY Left 6/10/2020    Procedure: DIRECT ANTERIOR TOTAL HIP ARTHROPLASTY;  Surgeon: Be Whitt MD;  Location: [facility] OR;  Service: Orthopedics     Current Outpatient Medications   Medication Sig Dispense Refill     hydrochlorothiazide (HYDRODIURIL) 12.5 MG tablet Take 1 tablet (12.5 mg) by mouth daily 90 tablet 3     MULTIVITAMIN OR None Entered       TRIAMCINOLONE ACETONIDE 0.05 % EX OINT AAA tid prn (Patient not taking: Reported on 3/8/2022) 30 g 1       No Known Allergies     Social History     Tobacco Use     Smoking status: Never     Smokeless tobacco: Never   Substance Use Topics     Alcohol use: Yes     Comment: Alcoholic Drinks/day: 2 drinks/month     {FAMILY HISTORY (Optional):461112068}  History   Drug Use Unknown         Objective     There were no vitals taken for this visit.    Physical Exam  {EXAM Preop  "Choices:111365}    Recent Labs   Lab Test 22  1144   HGB 14.9         POTASSIUM 4.0   CR 0.98   A1C 5.8*        Diagnostics:  {LABS:392066}   {EK}    Revised Cardiac Risk Index (RCRI):  The patient has the following serious cardiovascular risks for perioperative complications:  {PREOP REVISED CARDIAC RISK INDEX (RCRI) :991508::\" - No serious cardiac risks = 0 points\"}     RCRI Interpretation: {REVISED CARDIAC RISK INTERPRETATION :309533}         Signed Electronically by: Amy Donaldson MD  Copy of this evaluation report is provided to requesting physician.    {Provider Resources  Preop [facility] Preop Guidelines  Revised Cardiac Risk Index :428442}  Answers for HPI/ROS submitted by the patient on 2023  In general, how would you rate your overall physical health?: good  Frequency of exercise:: 2-3 days/week  Do you usually eat at least 4 servings of fruit and vegetables a day, include whole grains & fiber, and avoid regularly eating high fat or \"junk\" foods? : Yes  Taking medications regularly:: Yes  Medication side effects:: None  Activities of Daily Living: no assistance needed  Home safety: no safety concerns identified  Hearing Impairment:: no hearing concerns  In the past 6 months, have you been bothered by leaking of urine?: No  In general, how would you rate your overall mental or emotional health?: good  Additional concerns today:: No  Duration of exercise:: 15-30 minutes      "

## 2023-06-01 ENCOUNTER — TRANSFERRED RECORDS (OUTPATIENT)
Dept: HEALTH INFORMATION MANAGEMENT | Facility: CLINIC | Age: 68
End: 2023-06-01
Payer: COMMERCIAL

## 2023-10-03 ENCOUNTER — OFFICE VISIT (OUTPATIENT)
Dept: FAMILY MEDICINE | Facility: CLINIC | Age: 68
End: 2023-10-03
Payer: COMMERCIAL

## 2023-10-03 VITALS
BODY MASS INDEX: 35.1 KG/M2 | DIASTOLIC BLOOD PRESSURE: 85 MMHG | OXYGEN SATURATION: 93 % | TEMPERATURE: 98.3 F | SYSTOLIC BLOOD PRESSURE: 135 MMHG | WEIGHT: 185.9 LBS | HEIGHT: 61 IN | HEART RATE: 80 BPM

## 2023-10-03 DIAGNOSIS — Z12.11 SCREEN FOR COLON CANCER: ICD-10-CM

## 2023-10-03 DIAGNOSIS — Z23 NEED FOR TDAP VACCINATION: ICD-10-CM

## 2023-10-03 DIAGNOSIS — R05.1 ACUTE COUGH: Primary | ICD-10-CM

## 2023-10-03 DIAGNOSIS — Z23 NEED FOR COVID-19 VACCINE: ICD-10-CM

## 2023-10-03 DIAGNOSIS — K21.9 GASTROESOPHAGEAL REFLUX DISEASE WITHOUT ESOPHAGITIS: ICD-10-CM

## 2023-10-03 PROCEDURE — 91320 SARSCV2 VAC 30MCG TRS-SUC IM: CPT | Performed by: FAMILY MEDICINE

## 2023-10-03 PROCEDURE — 99214 OFFICE O/P EST MOD 30 MIN: CPT | Performed by: FAMILY MEDICINE

## 2023-10-03 PROCEDURE — 90480 ADMN SARSCOV2 VAC 1/ONLY CMP: CPT | Performed by: FAMILY MEDICINE

## 2023-10-03 RX ORDER — ALBUTEROL SULFATE 90 UG/1
2 AEROSOL, METERED RESPIRATORY (INHALATION) EVERY 6 HOURS PRN
Qty: 18 G | Refills: 1 | Status: SHIPPED | OUTPATIENT
Start: 2023-10-03 | End: 2024-01-11

## 2023-10-03 RX ORDER — PREDNISONE 20 MG/1
40 TABLET ORAL DAILY
Qty: 10 TABLET | Refills: 0 | Status: SHIPPED | OUTPATIENT
Start: 2023-10-03 | End: 2023-10-08

## 2023-10-03 RX ORDER — AZITHROMYCIN 250 MG/1
TABLET, FILM COATED ORAL
Qty: 6 TABLET | Refills: 0 | Status: SHIPPED | OUTPATIENT
Start: 2023-10-03 | End: 2023-10-08

## 2023-10-03 ASSESSMENT — PAIN SCALES - GENERAL: PAINLEVEL: NO PAIN (0)

## 2023-10-03 ASSESSMENT — ENCOUNTER SYMPTOMS: COUGH: 1

## 2023-10-03 NOTE — PROGRESS NOTES
"    Assessment & Plan     1. Acute cough  New problem.  Probably bronchitis vs atypical pneumonia, discussed viral vs bacterial infections.  Patient feels like symptoms are not significantly improving so she is interested in doing a trial of antibiotic, prednisone and albuterol inhaler.  Advised that if symptoms are not improving in the next couple weeks, she should follow-up for reevaluation.  - azithromycin (ZITHROMAX) 250 MG tablet; Take 2 tablets (500 mg) by mouth daily for 1 day, THEN 1 tablet (250 mg) daily for 4 days.  - albuterol (PROAIR HFA/PROVENTIL HFA/VENTOLIN HFA) 108 (90 Base) MCG/ACT inhaler; Inhale 2 puffs into the lungs every 6 hours as needed for shortness of breath, wheezing or cough  - predniSONE (DELTASONE) 20 MG tablet; Take 2 tablets (40 mg) by mouth daily for 5 days    Gastroesophageal reflux disease without esophagitis  New problem.  Trial of omeprazole.  - omeprazole (PRILOSEC) 20 MG DR capsule; Take 1 capsule (20 mg) by mouth daily    Need for Tdap vaccination  - Tdap, tetanus-diptheria-acell pertussis, (BOOSTRIX) 5-2.5-18.5 LF-MCG/0.5 MYRON injection; Inject 0.5 mLs into the muscle once for 1 dose    Screen for colon cancer  - Colonoscopy Screening  Referral; Future    Need for COVID-19 vaccine  - COVID-19 12+ (2023-24) (PFIZER)             BMI:   Estimated body mass index is 34.83 kg/m  as calculated from the following:    Height as of this encounter: 1.556 m (5' 1.26\").    Weight as of this encounter: 84.3 kg (185 lb 14.4 oz).   Weight management plan: Discussed healthy diet and exercise guidelines        Kaveh Fagan MD  [facility] ANSHUL Loomis is a 68 year old, presenting for the following health issues:  Cough (Past 3 weeks-cough and burning feeling in throat-feels like heartburn, negative COVID test)        10/3/2023     7:28 AM   Additional Questions   Roomed by [facility], Visit Facilitator     Answers submitted by the patient for " "this visit:  General Questionnaire (Submitted on 10/3/2023)  Chief Complaint: Chronic problems general questions HPI Form  How many servings of fruits and vegetables do you eat daily?: 2-3  On average, how many sweetened beverages do you drink each day (Examples: soda, juice, sweet tea, etc.  Do NOT count diet or artificially sweetened beverages)?: 0  How many minutes a day do you exercise enough to make your heart beat faster?: 20 to 29  How many days a week do you exercise enough to make your heart beat faster?: 3 or less  How many days per week do you miss taking your medication?: 0  General Concern (Submitted on 10/3/2023)  Chief Complaint: Chronic problems general questions HPI Form  What is the reason for your visit today?: cough  When did your symptoms begin?: 3-4 weeks ago    Cough    History of Present Illness       Reason for visit:  Cough  Symptom onset:  3-4 weeks ago    She eats 2-3 servings of fruits and vegetables daily.She consumes 0 sweetened beverage(s) daily.She exercises with enough effort to increase her heart rate 20 to 29 minutes per day.  She exercises with enough effort to increase her heart rate 3 or less days per week.   She is taking medications regularly.     For the last 3 weeks, worse in the morning and night, no fever, no runny nose, dry cough, no fatigue, no chest pain, + GERD - takes Tums at night because GERD symptoms come on when laying down.  Patient states there is a slight improvement in last 2 weeks but is having significant cough still without improvement from over-the-counter medication.              Review of Systems   Respiratory:  Positive for cough.             Objective    /85 (BP Location: Left arm, Patient Position: Sitting, Cuff Size: Adult Regular)   Pulse 80   Temp 98.3  F (36.8  C) (Oral)   Ht 1.556 m (5' 1.26\")   Wt 84.3 kg (185 lb 14.4 oz)   SpO2 93%   BMI 34.83 kg/m    Body mass index is 34.83 kg/m .  Physical Exam   GENERAL: healthy, alert and no " distress  HENT: ear canals and TM's normal, nose and mouth without ulcers or lesions  NECK: no adenopathy, no asymmetry, masses, or scars and thyroid normal to palpation  RESP: lungs clear to auscultation - no rales, rhonchi or wheezes and expiratory wheezes bibasilar  CV: regular rate and rhythm, normal S1 S2, no S3 or S4, no murmur, click or rub, no peripheral edema and peripheral pulses strong  MS: no gross musculoskeletal defects noted, no edema

## 2023-10-19 ENCOUNTER — TRANSFERRED RECORDS (OUTPATIENT)
Dept: HEALTH INFORMATION MANAGEMENT | Facility: CLINIC | Age: 68
End: 2023-10-19
Payer: COMMERCIAL

## 2023-10-30 DIAGNOSIS — K21.9 GASTROESOPHAGEAL REFLUX DISEASE WITHOUT ESOPHAGITIS: ICD-10-CM

## 2023-11-03 ENCOUNTER — TRANSFERRED RECORDS (OUTPATIENT)
Dept: HEALTH INFORMATION MANAGEMENT | Facility: CLINIC | Age: 68
End: 2023-11-03
Payer: COMMERCIAL

## 2023-12-20 ENCOUNTER — TRANSFERRED RECORDS (OUTPATIENT)
Dept: MULTI SPECIALTY CLINIC | Facility: CLINIC | Age: 68
End: 2023-12-20

## 2023-12-21 ENCOUNTER — PATIENT OUTREACH (OUTPATIENT)
Dept: GASTROENTEROLOGY | Facility: CLINIC | Age: 68
End: 2023-12-21
Payer: COMMERCIAL

## 2024-01-11 ENCOUNTER — OFFICE VISIT (OUTPATIENT)
Dept: FAMILY MEDICINE | Facility: CLINIC | Age: 69
End: 2024-01-11
Payer: COMMERCIAL

## 2024-01-11 VITALS
OXYGEN SATURATION: 97 % | RESPIRATION RATE: 16 BRPM | HEART RATE: 82 BPM | SYSTOLIC BLOOD PRESSURE: 132 MMHG | HEIGHT: 61 IN | DIASTOLIC BLOOD PRESSURE: 82 MMHG | WEIGHT: 179.8 LBS | BODY MASS INDEX: 33.95 KG/M2

## 2024-01-11 DIAGNOSIS — E66.09 CLASS 1 OBESITY DUE TO EXCESS CALORIES WITH SERIOUS COMORBIDITY AND BODY MASS INDEX (BMI) OF 33.0 TO 33.9 IN ADULT: ICD-10-CM

## 2024-01-11 DIAGNOSIS — I10 ESSENTIAL (PRIMARY) HYPERTENSION: ICD-10-CM

## 2024-01-11 DIAGNOSIS — Z00.00 MEDICARE ANNUAL WELLNESS VISIT, SUBSEQUENT: Primary | ICD-10-CM

## 2024-01-11 DIAGNOSIS — E66.811 CLASS 1 OBESITY DUE TO EXCESS CALORIES WITH SERIOUS COMORBIDITY AND BODY MASS INDEX (BMI) OF 33.0 TO 33.9 IN ADULT: ICD-10-CM

## 2024-01-11 DIAGNOSIS — Z78.0 POST-MENOPAUSAL: ICD-10-CM

## 2024-01-11 DIAGNOSIS — R73.03 PREDIABETES: ICD-10-CM

## 2024-01-11 DIAGNOSIS — N18.31 CHRONIC KIDNEY DISEASE, STAGE 3A (H): ICD-10-CM

## 2024-01-11 DIAGNOSIS — E78.5 HYPERLIPIDEMIA LDL GOAL <100: ICD-10-CM

## 2024-01-11 LAB
CREAT UR-MCNC: 38.3 MG/DL
HBA1C MFR BLD: 5.9 % (ref 0–5.6)
HGB BLD-MCNC: 15.9 G/DL (ref 11.7–15.7)
MICROALBUMIN UR-MCNC: <12 MG/L
MICROALBUMIN/CREAT UR: NORMAL MG/G{CREAT}

## 2024-01-11 PROCEDURE — 83036 HEMOGLOBIN GLYCOSYLATED A1C: CPT | Performed by: FAMILY MEDICINE

## 2024-01-11 PROCEDURE — 80048 BASIC METABOLIC PNL TOTAL CA: CPT | Performed by: FAMILY MEDICINE

## 2024-01-11 PROCEDURE — 80061 LIPID PANEL: CPT | Performed by: FAMILY MEDICINE

## 2024-01-11 PROCEDURE — G0439 PPPS, SUBSEQ VISIT: HCPCS | Performed by: FAMILY MEDICINE

## 2024-01-11 PROCEDURE — 82043 UR ALBUMIN QUANTITATIVE: CPT | Performed by: FAMILY MEDICINE

## 2024-01-11 PROCEDURE — 85018 HEMOGLOBIN: CPT | Performed by: FAMILY MEDICINE

## 2024-01-11 PROCEDURE — 82570 ASSAY OF URINE CREATININE: CPT | Performed by: FAMILY MEDICINE

## 2024-01-11 PROCEDURE — 36415 COLL VENOUS BLD VENIPUNCTURE: CPT | Performed by: FAMILY MEDICINE

## 2024-01-11 RX ORDER — ATORVASTATIN CALCIUM 10 MG/1
10 TABLET, FILM COATED ORAL DAILY
Qty: 90 TABLET | Refills: 3 | Status: SHIPPED | OUTPATIENT
Start: 2024-01-11

## 2024-01-11 RX ORDER — HYDROCHLOROTHIAZIDE 12.5 MG/1
12.5 TABLET ORAL DAILY
Qty: 90 TABLET | Refills: 3 | Status: SHIPPED | OUTPATIENT
Start: 2024-01-11

## 2024-01-11 RX ORDER — HYDROCHLOROTHIAZIDE 12.5 MG/1
12.5 TABLET ORAL DAILY
Qty: 90 TABLET | Refills: 3 | OUTPATIENT
Start: 2024-01-11

## 2024-01-11 ASSESSMENT — ENCOUNTER SYMPTOMS
CHILLS: 0
FEVER: 0
DIZZINESS: 0
SORE THROAT: 0
JOINT SWELLING: 0
HEARTBURN: 0
DIARRHEA: 0
NAUSEA: 0
CONSTIPATION: 0
NERVOUS/ANXIOUS: 0
HEMATURIA: 0
EYE PAIN: 0
FREQUENCY: 0
SHORTNESS OF BREATH: 0
DYSURIA: 0
ARTHRALGIAS: 0
WEAKNESS: 0
HEMATOCHEZIA: 0
HEADACHES: 0
PALPITATIONS: 0
MYALGIAS: 0
COUGH: 0
ABDOMINAL PAIN: 0
BREAST MASS: 0
PARESTHESIAS: 0

## 2024-01-11 ASSESSMENT — ACTIVITIES OF DAILY LIVING (ADL): CURRENT_FUNCTION: NO ASSISTANCE NEEDED

## 2024-01-11 NOTE — PROGRESS NOTES
"SUBJECTIVE:   Ebonie is a 68 year old, presenting for the following:  Wellness Visit (Not fasting)        1/11/2024    10:36 AM   Additional Questions   Roomed by Radha CA LPN       Are you in the first 12 months of your Medicare coverage?  No    Healthy Habits:     In general, how would you rate your overall health?  Good    Frequency of exercise:  2-3 days/week    Duration of exercise:  15-30 minutes    Do you usually eat at least 4 servings of fruit and vegetables a day, include whole grains    & fiber and avoid regularly eating high fat or \"junk\" foods?  Yes    Taking medications regularly:  Yes    Medication side effects:  None    Ability to successfully perform activities of daily living:  No assistance needed    Home Safety:  No safety concerns identified    Hearing Impairment:  No hearing concerns    In the past 6 months, have you been bothered by leaking of urine?  No    In general, how would you rate your overall mental or emotional health?  Good    Chief Complaint   Patient presents with    Wellness Visit     Not fasting     HTN; on hydrochlorothiazide 12.5mg daily, home BPs are 127-131/80s range. Diastolics always in the upper 80s since her COVID weight gain.      Going to [place] end of March for a few weeks    GERD: rarely using omeprazole-used it about 2 weeks after a chronic cough situation persisted in Oct 2023; doesn't need that refilled.       BMI 33 now. Lost a little weight since last year.     Colonoscopy was updated 11/2023, due in 3 years.   Mammogram UTD from 11/2022  Declines bone density; s/p left hip replacement for arthritis    The 10-year ASCVD risk score (Divya CAMARILLO, et al., 2019) is: 10%    Values used to calculate the score:      Age: 68 years      Sex: Female      Is Non- : No      Diabetic: No      Tobacco smoker: No      Systolic Blood Pressure: 132 mmHg      Is BP treated: No      HDL Cholesterol: 58 mg/dL      Total Cholesterol: 281 mg/dL      Wt Readings " from Last 3 Encounters:   01/11/24 81.6 kg (179 lb 12.8 oz)   10/03/23 84.3 kg (185 lb 14.4 oz)   01/06/23 83 kg (182 lb 14.4 oz)     Social History     Social History Narrative    Living in a [facility]. . 2 children, 4 grandchildren; all live in the area. 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 Nonsmoker; no alcohol.   Amy Donaldson MD         Today's PHQ-2 Score:       1/11/2024     9:54 AM   PHQ-2 ( 1999 [employer])   Q1: Little interest or pleasure in doing things 0   Q2: Feeling down, depressed or hopeless 0   PHQ-2 Score 0   Q1: Little interest or pleasure in doing things Not at all   Q2: Feeling down, depressed or hopeless Not at all   PHQ-2 Score 0           Have you ever done Advance Care Planning? (For example, a Health Directive, POLST, or a discussion with a medical provider or your loved ones about your wishes): No, advance care planning information given to patient to review.  Patient plans to discuss their wishes with loved ones or provider.         Fall risk  Fallen 2 or more times in the past year?: No  Any fall with injury in the past year?: No    Cognitive Screening   1) Repeat 3 items (Leader, Season, Table)    2) Clock draw: ABNORMAL estella hands in wrong place  3) 3 item recall: Recalls 2 objects   Results: ABNORMAL clock, 1-2 items recalled: PROBABLE COGNITIVE IMPAIRMENT, **INFORM PROVIDER**    Mini-CogTM Copyright ALBERT Ivey. Licensed by the author for use in [facility]; reprinted with permission (yana@.[facility]). All rights reserved.        Reviewed and updated as needed this visit by clinical staff   Tobacco  Allergies               Reviewed and updated as needed this visit by Provider                 Social History     Tobacco Use    Smoking status: Never     Passive exposure: Never    Smokeless tobacco: Never   Substance Use Topics    Alcohol use: Yes     Comment: Alcoholic Drinks/day: 2 drinks/month             1/11/2024     9:53 AM   Alcohol Use    Prescreen: >3 drinks/day or >7 drinks/week? No          No data to display              Do you have a current opioid prescription? No  Do you use any other controlled substances or medications that are not prescribed by a provider? None  Current providers sharing in care for this patient include:   Patient Care Team:  Amy Donaldson MD as PCP - General (Family Practice)  Amy Donaldson MD as Assigned PCP    The following health maintenance items are reviewed in Epic and correct as of today:  Health Maintenance   Topic Date Due    DEXA  Never done    MICROALBUMIN  Never done    RSV VACCINE (Pregnancy & 60+) (1 - 1-dose 60+ series) Never done    DTAP/TDAP/TD IMMUNIZATION (2 - Td or Tdap) 07/16/2023    INFLUENZA VACCINE (1) Never done    BMP  01/06/2024    LIPID  01/06/2024    MAMMO SCREENING  11/11/2024    MEDICARE ANNUAL WELLNESS VISIT  01/11/2025    ANNUAL REVIEW OF HM ORDERS  01/11/2025    FALL RISK ASSESSMENT  01/11/2025    HEMOGLOBIN  01/11/2025    COLORECTAL CANCER SCREENING  11/03/2026    ADVANCE CARE PLANNING  01/11/2029    PHQ-2 (once per calendar year)  Completed    Pneumococcal Vaccine: 65+ Years  Completed    URINALYSIS  Completed    ZOSTER IMMUNIZATION  Completed    COVID-19 Vaccine  Completed    IPV IMMUNIZATION  Aged Out    HPV IMMUNIZATION  Aged Out    MENINGITIS IMMUNIZATION  Aged Out    RSV MONOCLONAL ANTIBODY  Aged Out    HEPATITIS C SCREENING  Discontinued               3/8/2022    10:44 AM   Breast CA Risk Assessment (FHS-7)   Do you have a family history of breast, colon, or ovarian cancer? No / Unknown         Pertinent mammograms are reviewed under the imaging tab.    Review of Systems   Constitutional:  Negative for chills and fever.   HENT:  Negative for congestion, ear pain, hearing loss and sore throat.    Eyes:  Negative for pain and visual disturbance.   Respiratory:  Negative for cough and shortness of breath.    Cardiovascular:  Negative for chest pain,  "palpitations and peripheral edema.   Gastrointestinal:  Negative for abdominal pain, constipation, diarrhea, heartburn, hematochezia and nausea.   Breasts:  Negative for tenderness, breast mass and discharge.   Genitourinary:  Negative for dysuria, frequency, genital sores, hematuria, pelvic pain, urgency, vaginal bleeding and vaginal discharge.   Musculoskeletal:  Negative for arthralgias, joint swelling and myalgias.   Skin:  Negative for rash.   Neurological:  Negative for dizziness, weakness, headaches and paresthesias.   Psychiatric/Behavioral:  Negative for mood changes. The patient is not nervous/anxious.        OBJECTIVE:   /82 (BP Location: Left arm, Patient Position: Sitting, Cuff Size: Adult Large)   Pulse 82   Resp 16   Ht 1.557 m (5' 1.3\")   Wt 81.6 kg (179 lb 12.8 oz)   LMP  (LMP Unknown)   SpO2 97%   BMI 33.64 kg/m   Estimated body mass index is 33.64 kg/m  as calculated from the following:    Height as of this encounter: 1.557 m (5' 1.3\").    Weight as of this encounter: 81.6 kg (179 lb 12.8 oz).  Physical Exam  GENERAL: healthy, alert and no distress  EYES: Eyes grossly normal to inspection, PERRL and conjunctivae and sclerae normal  HENT: ear canals and TM's normal, nose and mouth without ulcers or lesions  NECK: no adenopathy, no asymmetry, masses, or scars and thyroid normal to palpation  RESP: lungs clear to auscultation - no rales, rhonchi or wheezes  BREAST: declined  CV: regular rate and rhythm, normal S1 S2, no S3 or S4, no murmur, click or rub, no peripheral edema and peripheral pulses strong  ABDOMEN: soft, nontender, no hepatosplenomegaly, no masses and bowel sounds normal  MS: no gross musculoskeletal defects noted, no edema  SKIN: no suspicious lesions or rashes  NEURO: Normal strength and tone, mentation intact and speech normal  PSYCH: mentation appears normal, affect normal/bright    Diagnostic Test Results:  Labs reviewed in Epic    ASSESSMENT / PLAN:   Ebonie was seen " today for wellness visit.    Diagnoses and all orders for this visit:    Medicare annual wellness visit, subsequent  - Home safety information was reviewed if pertinent for falls prevention: regular checkups with vision and hearing, mindful medication use elke with OTCs, using any assisted walking devices, adequate shoes and feet wear, avoiding loose rugs, safety additions to the home if needed, keeping the body in good shape with regular exercise especially walking, and limiting alcohol intake.  - Social history was reviewed  - Patient is independent with activities of daily living  - We reviewed active symptoms and discussed management  - We reviewed list of healthcare providers for this patient.  - We also reviewed PHQ 9  -     Hemoglobin A1c  -     Hemoglobin  -     Lipid panel reflex to direct LDL Non-fasting  -     Basic metabolic panel      Chronic kidney disease, stage 3a (H)  Reviewed this dx in detail with pt and lifestyle modifications, blood sugar/blood pressure management to mitigate worsening. She understands to avoid nephrotoxic agents    Will consider addition of ACEI/ARB to her BP regimen pending albumin/BMP results as her last two GFRs have been in the 60s though prior to that in the 50-60 range.  -     Albumin Random Urine Quantitative with Creat Ratio     Essential (primary) hypertension  BP well controlled but diastolics have room for improvement in the upper 80s. Continue low dose hydrochlorothiazide and priority placed on weight loss with healthy lifestyle changes. Low salt diet, exercise and increasing healthy foods, decreasing fast foods etc.   -     hydrochlorothiazide (HYDRODIURIL) 12.5 MG tablet; Take 1 tablet (12.5 mg) by mouth daily     Prediabetes  BMI 33, obesity with serious comorbidity (HTN)  Risks of obesity were discussed, including co-morbidities such as diabetes, HTN, HLD, CAD and stroke.   - encouraged moderate physical activity of 150 minutes per week  - encouraged healthy  dietary choices like eating real foods, increasing protein & vegetables, and watching portion sizes.    Post-menopausal  -     DEXA HIP/PELVIS/SPINE - Future; Future    Hyperlipidemia LDL goal <100  -     atorvastatin (LIPITOR) 10 MG tablet; Take 1 tablet (10 mg) by mouth daily        Patient has been advised of split billing requirements and indicates understanding: Yes      COUNSELING:  Reviewed preventive health counseling, as reflected in patient instructions        She reports that she has never smoked. She has never been exposed to tobacco smoke. She has never used smokeless tobacco.      Appropriate preventive services were discussed with this patient, including applicable screening as appropriate for fall prevention, nutrition, physical activity, Tobacco-use cessation, weight loss and cognition.  Checklist reviewing preventive services available has been given to the patient.    Reviewed patients plan of care and provided an AVS. The Basic Care Plan (routine screening as documented in Health Maintenance) for Glenys meets the Care Plan requirement. This Care Plan has been established and reviewed with the Patient.        Amy Donaldson MD  [facility]    Identified Health Risks:  I have reviewed Opioid Use Disorder and Substance Use Disorder risk factors and made any needed referrals.  (NOT needed)

## 2024-01-11 NOTE — PATIENT INSTRUCTIONS
Send us a copy of your advanced health care directive    I sent the new prescription for Lipitor (atorvastatin)- we will likely plan to start that    We can consider changing your BP medication to add a combo dose (single pill still) pending upcoming labs      RSV vaccine and Tdap vaccine at the pharmacy

## 2024-01-12 LAB
ANION GAP SERPL CALCULATED.3IONS-SCNC: 13 MMOL/L (ref 7–15)
BUN SERPL-MCNC: 18.6 MG/DL (ref 8–23)
CALCIUM SERPL-MCNC: 10.5 MG/DL (ref 8.8–10.2)
CHLORIDE SERPL-SCNC: 102 MMOL/L (ref 98–107)
CHOLEST SERPL-MCNC: 277 MG/DL
CREAT SERPL-MCNC: 0.94 MG/DL (ref 0.51–0.95)
DEPRECATED HCO3 PLAS-SCNC: 25 MMOL/L (ref 22–29)
EGFRCR SERPLBLD CKD-EPI 2021: 65 ML/MIN/1.73M2
FASTING STATUS PATIENT QL REPORTED: ABNORMAL
GLUCOSE SERPL-MCNC: 99 MG/DL (ref 70–99)
HDLC SERPL-MCNC: 58 MG/DL
LDLC SERPL CALC-MCNC: 172 MG/DL
NONHDLC SERPL-MCNC: 219 MG/DL
POTASSIUM SERPL-SCNC: 3.7 MMOL/L (ref 3.4–5.3)
SODIUM SERPL-SCNC: 140 MMOL/L (ref 135–145)
TRIGL SERPL-MCNC: 233 MG/DL

## 2024-01-24 ENCOUNTER — TRANSFERRED RECORDS (OUTPATIENT)
Dept: HEALTH INFORMATION MANAGEMENT | Facility: CLINIC | Age: 69
End: 2024-01-24
Payer: COMMERCIAL

## 2024-06-20 ENCOUNTER — OFFICE VISIT (OUTPATIENT)
Dept: FAMILY MEDICINE | Facility: CLINIC | Age: 69
End: 2024-06-20
Payer: COMMERCIAL

## 2024-06-20 ENCOUNTER — TELEPHONE (OUTPATIENT)
Dept: FAMILY MEDICINE | Facility: CLINIC | Age: 69
End: 2024-06-20

## 2024-06-20 VITALS
WEIGHT: 181 LBS | TEMPERATURE: 98 F | SYSTOLIC BLOOD PRESSURE: 129 MMHG | HEART RATE: 72 BPM | DIASTOLIC BLOOD PRESSURE: 80 MMHG | HEIGHT: 61 IN | OXYGEN SATURATION: 97 % | RESPIRATION RATE: 16 BRPM | BODY MASS INDEX: 34.17 KG/M2

## 2024-06-20 DIAGNOSIS — E66.811 CLASS 1 OBESITY DUE TO EXCESS CALORIES WITH SERIOUS COMORBIDITY AND BODY MASS INDEX (BMI) OF 33.0 TO 33.9 IN ADULT: ICD-10-CM

## 2024-06-20 DIAGNOSIS — R73.03 PREDIABETES: ICD-10-CM

## 2024-06-20 DIAGNOSIS — E66.09 CLASS 1 OBESITY DUE TO EXCESS CALORIES WITH SERIOUS COMORBIDITY AND BODY MASS INDEX (BMI) OF 33.0 TO 33.9 IN ADULT: ICD-10-CM

## 2024-06-20 DIAGNOSIS — I10 ESSENTIAL HYPERTENSION, BENIGN: ICD-10-CM

## 2024-06-20 DIAGNOSIS — E78.5 HYPERLIPIDEMIA LDL GOAL <100: ICD-10-CM

## 2024-06-20 DIAGNOSIS — E78.5 HYPERLIPIDEMIA LDL GOAL <100: Primary | ICD-10-CM

## 2024-06-20 DIAGNOSIS — R73.01 ELEVATED FASTING BLOOD SUGAR: ICD-10-CM

## 2024-06-20 LAB
ALBUMIN SERPL BCG-MCNC: 4.7 G/DL (ref 3.5–5.2)
ALP SERPL-CCNC: 117 U/L (ref 40–150)
ALT SERPL W P-5'-P-CCNC: 27 U/L (ref 0–50)
ANION GAP SERPL CALCULATED.3IONS-SCNC: 13 MMOL/L (ref 7–15)
AST SERPL W P-5'-P-CCNC: 27 U/L (ref 0–45)
BILIRUB SERPL-MCNC: 0.6 MG/DL
BUN SERPL-MCNC: 26.1 MG/DL (ref 8–23)
CALCIUM SERPL-MCNC: 9.7 MG/DL (ref 8.8–10.2)
CHLORIDE SERPL-SCNC: 102 MMOL/L (ref 98–107)
CHOLEST SERPL-MCNC: 184 MG/DL
CREAT SERPL-MCNC: 1.1 MG/DL (ref 0.51–0.95)
DEPRECATED HCO3 PLAS-SCNC: 24 MMOL/L (ref 22–29)
EGFRCR SERPLBLD CKD-EPI 2021: 54 ML/MIN/1.73M2
FASTING STATUS PATIENT QL REPORTED: YES
FASTING STATUS PATIENT QL REPORTED: YES
GLUCOSE SERPL-MCNC: 105 MG/DL (ref 70–99)
HBA1C MFR BLD: 5.9 % (ref 0–5.6)
HDLC SERPL-MCNC: 57 MG/DL
LDLC SERPL CALC-MCNC: 106 MG/DL
NONHDLC SERPL-MCNC: 127 MG/DL
POTASSIUM SERPL-SCNC: 4.2 MMOL/L (ref 3.4–5.3)
PROT SERPL-MCNC: 7.4 G/DL (ref 6.4–8.3)
SODIUM SERPL-SCNC: 139 MMOL/L (ref 135–145)
TRIGL SERPL-MCNC: 105 MG/DL

## 2024-06-20 PROCEDURE — 80061 LIPID PANEL: CPT | Performed by: FAMILY MEDICINE

## 2024-06-20 PROCEDURE — 99214 OFFICE O/P EST MOD 30 MIN: CPT | Performed by: FAMILY MEDICINE

## 2024-06-20 PROCEDURE — 83036 HEMOGLOBIN GLYCOSYLATED A1C: CPT | Performed by: FAMILY MEDICINE

## 2024-06-20 PROCEDURE — G2211 COMPLEX E/M VISIT ADD ON: HCPCS | Performed by: FAMILY MEDICINE

## 2024-06-20 PROCEDURE — 36415 COLL VENOUS BLD VENIPUNCTURE: CPT | Performed by: FAMILY MEDICINE

## 2024-06-20 PROCEDURE — 80053 COMPREHEN METABOLIC PANEL: CPT | Performed by: FAMILY MEDICINE

## 2024-06-20 ASSESSMENT — PAIN SCALES - GENERAL: PAINLEVEL: NO PAIN (0)

## 2024-06-20 NOTE — TELEPHONE ENCOUNTER
Reason for call:  MEDICATION UPDATE/PROVIDER MESSAGE  Called and spoke with patient regarding provider's message. Patient is OK with starting Wegovy $230/month. Would like to know what pharmacy this is being sent to.

## 2024-06-20 NOTE — TELEPHONE ENCOUNTER
Left message for Ebonie that medication was sent over to the compounding pharmacy . TAYO BRADSHAW on 6/20/2024 at 2:49 PM

## 2024-06-20 NOTE — TELEPHONE ENCOUNTER
Please notify patient that Wegovy was denied by her insurance.  We could send a prescription to a compounding pharmacy for Wegovy 0.25 mg weekly, which would cost her $230.  Please ask if she is interested with a prescription through the compounding pharmacy.    Kaveh Fagan MD

## 2024-06-20 NOTE — PROGRESS NOTES
"  Assessment & Plan     Hyperlipidemia LDL goal <100  Continue atorvastatin 10 mg nightly, check lipids to see how atorvastatin is working.  Notify patient with results.  - Semaglutide-Weight Management (WEGOVY) 0.25 MG/0.5ML pen; Inject 0.25 mg Subcutaneous once a week  - Comprehensive metabolic panel; Future  - Lipid Profile; Future    Class 1 obesity due to excess calories with serious comorbidity and body mass index (BMI) of 33.0 to 33.9 in adult  Discussed diet and exercise, which she is doing.  Prescription for Wegovy to see if it is covered.  - Semaglutide-Weight Management (WEGOVY) 0.25 MG/0.5ML pen; Inject 0.25 mg Subcutaneous once a week    Prediabetes  Discussed diet and exercise, which she is doing.  Prescription for Wegovy to see if it is covered.  - Semaglutide-Weight Management (WEGOVY) 0.25 MG/0.5ML pen; Inject 0.25 mg Subcutaneous once a week    Essential hypertension, benign  Controlled at home.  Continue hydrochlorothiazide 12.5 mg daily.  - Semaglutide-Weight Management (WEGOVY) 0.25 MG/0.5ML pen; Inject 0.25 mg Subcutaneous once a week    Elevated fasting blood sugar  Check labs and notify with results  - Hemoglobin A1c; Future      The longitudinal plan of care for the diagnosis(es)/condition(s) as documented were addressed during this visit. Due to the added complexity in care, I will continue to support Ebonie in the subsequent management and with ongoing continuity of care.      BMI  Estimated body mass index is 33.87 kg/m  as calculated from the following:    Height as of this encounter: 1.557 m (5' 1.3\").    Weight as of this encounter: 82.1 kg (181 lb).   Weight management plan: Discussed healthy diet and exercise guidelines          Shannan Loomis is a 68 year old, presenting for the following health issues:  Weight Check (Discuss options for weight loss medications) and Lipids (Recheck cholesterol - 6 months follow up)        6/20/2024     8:18 AM   Additional Questions   Roomed by Area " "Lizzette, Visit Facilitator     History of Present Illness       Reason for visit:  Labs for cholestrol and weight loss       Hyperlipidemia: Patient had labs done in January that showed a LDL cholesterol of 172, she was started on atorvastatin 10 mg nightly.  She is here to recheck her labs.    Prediabetes: Patient had an A1c of 5.9 in January.    Obesity: Patient's BMI is 33.87. She has been trying with diet and exercise without any weight loss.    HTN: She is on hydrochlorothiazide 12.5 mg daily. 129/80 on home blood pressure check.            Review of Systems  Constitutional, HEENT, cardiovascular, pulmonary, gi and gu systems are negative, except as otherwise noted.      Objective    BP (!) 144/88 (BP Location: Left arm, Patient Position: Sitting, Cuff Size: Adult Large)   Pulse 72   Temp 98  F (36.7  C) (Oral)   Resp 16   Ht 1.557 m (5' 1.3\")   Wt 82.1 kg (181 lb)   LMP  (LMP Unknown)   SpO2 97%   BMI 33.87 kg/m    Body mass index is 33.87 kg/m .  Physical Exam   GENERAL: alert and no distress  PSYCH: mentation appears normal, affect normal/bright            Signed Electronically by: Kaveh Fagan MD    "

## 2024-06-20 NOTE — TELEPHONE ENCOUNTER
PRIOR AUTHORIZATION DENIED    Medication: SEMAGLUTIDE-WEIGHT MANAGEMENT 0.25 MG/0.5ML SC SOAJ  Insurance Company: [employer] - Phone [phone] Fax [phone]  Denial Date: 6/20/2024  Denial Reason(s):     Appeal Information:     Patient Notified: No

## 2024-07-01 ENCOUNTER — TELEPHONE (OUTPATIENT)
Dept: FAMILY MEDICINE | Facility: CLINIC | Age: 69
End: 2024-07-01
Payer: COMMERCIAL

## 2024-07-01 NOTE — TELEPHONE ENCOUNTER
Forms/Letter Request    Type of form/letter: OTHER: Rx Denial Appeal       Do we have the form/letter: Yes: CMT folder     Who is the form from? Patient    Where did/will the form come from? Patient or family brought in       When is form/letter needed by: ASAP    How would you like the form/letter returned: N/A    Patient Notified form requests are processed in 5-7 business days:Yes    Could we send this information to you in [employer] or would you prefer to receive a phone call?:   No preference   Okay to leave a detailed message?: Yes at Cell number on file:    Telephone Information:   Mobile [phone]

## 2024-07-01 NOTE — TELEPHONE ENCOUNTER
Patient is requesting appeal on Wegovy prescription.  Please ask PA team to proceed with appeal and update patient.    Kaveh Fagan MD

## 2024-07-01 NOTE — TELEPHONE ENCOUNTER
Prior Authorization  Please initiate PA, med/dosage not covered by insurance.Previously denied. Provider would like to submit an appeal    Medication: Semaglutide-Weight Management (WEGOVY) 0.25 MG/0.5ML pen     Insurance Name: [employer] - MEDICARE ADVANTAGE  Insurance ID: [id number]    Pharmacy:  [facility] - [place] [address]

## 2024-07-12 NOTE — TELEPHONE ENCOUNTER
Medication Appeal Initiation    Medication: SEMAGLUTIDE-WEIGHT MANAGEMENT 0.25 MG/0.5ML SC SOAJ  Appeal Start Date:  7/12/2024  Insurance Company: [employer] - Phone [phone] Fax [phone]   Insurance Phone:   Insurance Fax:   Comments:

## 2024-08-22 ENCOUNTER — MYC REFILL (OUTPATIENT)
Dept: FAMILY MEDICINE | Facility: CLINIC | Age: 69
End: 2024-08-22
Payer: COMMERCIAL

## 2024-08-22 DIAGNOSIS — E66.09 CLASS 1 OBESITY DUE TO EXCESS CALORIES WITH SERIOUS COMORBIDITY AND BODY MASS INDEX (BMI) OF 33.0 TO 33.9 IN ADULT: ICD-10-CM

## 2024-08-22 DIAGNOSIS — E66.811 CLASS 1 OBESITY DUE TO EXCESS CALORIES WITH SERIOUS COMORBIDITY AND BODY MASS INDEX (BMI) OF 33.0 TO 33.9 IN ADULT: ICD-10-CM

## 2024-08-22 DIAGNOSIS — E78.5 HYPERLIPIDEMIA LDL GOAL <100: ICD-10-CM

## 2024-08-22 DIAGNOSIS — R73.03 PREDIABETES: ICD-10-CM

## 2024-08-22 DIAGNOSIS — I10 ESSENTIAL HYPERTENSION, BENIGN: ICD-10-CM

## 2024-10-07 DIAGNOSIS — Z00.00 MEDICARE ANNUAL WELLNESS VISIT, SUBSEQUENT: ICD-10-CM

## 2024-10-07 DIAGNOSIS — I10 ESSENTIAL (PRIMARY) HYPERTENSION: ICD-10-CM

## 2024-10-07 DIAGNOSIS — E78.5 HYPERLIPIDEMIA LDL GOAL <100: ICD-10-CM

## 2024-10-07 RX ORDER — HYDROCHLOROTHIAZIDE 12.5 MG/1
12.5 TABLET ORAL DAILY
Qty: 90 TABLET | Refills: 3 | OUTPATIENT
Start: 2024-10-07

## 2024-10-07 RX ORDER — ATORVASTATIN CALCIUM 10 MG/1
10 TABLET, FILM COATED ORAL DAILY
Qty: 90 TABLET | Refills: 3 | OUTPATIENT
Start: 2024-10-07

## 2024-10-14 ENCOUNTER — PATIENT OUTREACH (OUTPATIENT)
Dept: CARE COORDINATION | Facility: CLINIC | Age: 69
End: 2024-10-14
Payer: COMMERCIAL

## 2024-10-19 ENCOUNTER — MYC REFILL (OUTPATIENT)
Dept: FAMILY MEDICINE | Facility: CLINIC | Age: 69
End: 2024-10-19
Payer: COMMERCIAL

## 2024-10-19 DIAGNOSIS — E66.09 CLASS 1 OBESITY DUE TO EXCESS CALORIES WITH SERIOUS COMORBIDITY AND BODY MASS INDEX (BMI) OF 33.0 TO 33.9 IN ADULT: ICD-10-CM

## 2024-10-19 DIAGNOSIS — E66.811 CLASS 1 OBESITY DUE TO EXCESS CALORIES WITH SERIOUS COMORBIDITY AND BODY MASS INDEX (BMI) OF 33.0 TO 33.9 IN ADULT: ICD-10-CM

## 2024-10-22 ENCOUNTER — MYC MEDICAL ADVICE (OUTPATIENT)
Dept: FAMILY MEDICINE | Facility: CLINIC | Age: 69
End: 2024-10-22
Payer: COMMERCIAL

## 2024-10-22 DIAGNOSIS — E66.09 CLASS 1 OBESITY DUE TO EXCESS CALORIES WITH SERIOUS COMORBIDITY AND BODY MASS INDEX (BMI) OF 33.0 TO 33.9 IN ADULT: ICD-10-CM

## 2024-10-22 DIAGNOSIS — E66.811 CLASS 1 OBESITY DUE TO EXCESS CALORIES WITH SERIOUS COMORBIDITY AND BODY MASS INDEX (BMI) OF 33.0 TO 33.9 IN ADULT: ICD-10-CM

## 2024-10-22 NOTE — TELEPHONE ENCOUNTER
Routing to provider for review.    Pt has questing regarding denial of Semaglutide, 2 MG/DOSE, (OZEMPIC) 8 MG/3ML pen    Bettina Hendrix RN

## 2024-11-11 ENCOUNTER — PATIENT OUTREACH (OUTPATIENT)
Dept: CARE COORDINATION | Facility: CLINIC | Age: 69
End: 2024-11-11
Payer: COMMERCIAL

## 2024-11-15 ENCOUNTER — TRANSFERRED RECORDS (OUTPATIENT)
Dept: HEALTH INFORMATION MANAGEMENT | Facility: CLINIC | Age: 69
End: 2024-11-15
Payer: COMMERCIAL

## 2025-01-18 SDOH — HEALTH STABILITY: PHYSICAL HEALTH: ON AVERAGE, HOW MANY MINUTES DO YOU ENGAGE IN EXERCISE AT THIS LEVEL?: 30 MIN

## 2025-01-18 SDOH — HEALTH STABILITY: PHYSICAL HEALTH: ON AVERAGE, HOW MANY DAYS PER WEEK DO YOU ENGAGE IN MODERATE TO STRENUOUS EXERCISE (LIKE A BRISK WALK)?: 3 DAYS

## 2025-01-18 ASSESSMENT — SOCIAL DETERMINANTS OF HEALTH (SDOH): HOW OFTEN DO YOU GET TOGETHER WITH FRIENDS OR RELATIVES?: TWICE A WEEK

## 2025-01-23 ENCOUNTER — OFFICE VISIT (OUTPATIENT)
Dept: FAMILY MEDICINE | Facility: CLINIC | Age: 70
End: 2025-01-23
Payer: COMMERCIAL

## 2025-01-23 VITALS
DIASTOLIC BLOOD PRESSURE: 97 MMHG | OXYGEN SATURATION: 94 % | HEART RATE: 99 BPM | WEIGHT: 170.5 LBS | HEIGHT: 61 IN | SYSTOLIC BLOOD PRESSURE: 151 MMHG | TEMPERATURE: 97.3 F | RESPIRATION RATE: 14 BRPM | BODY MASS INDEX: 32.19 KG/M2

## 2025-01-23 DIAGNOSIS — N18.31 CHRONIC KIDNEY DISEASE, STAGE 3A (H): ICD-10-CM

## 2025-01-23 DIAGNOSIS — R73.01 ELEVATED FASTING BLOOD SUGAR: ICD-10-CM

## 2025-01-23 DIAGNOSIS — E66.811 CLASS 1 OBESITY DUE TO EXCESS CALORIES WITH SERIOUS COMORBIDITY AND BODY MASS INDEX (BMI) OF 33.0 TO 33.9 IN ADULT: ICD-10-CM

## 2025-01-23 DIAGNOSIS — E78.5 HYPERLIPIDEMIA LDL GOAL <100: ICD-10-CM

## 2025-01-23 DIAGNOSIS — Z78.0 ASYMPTOMATIC POSTMENOPAUSAL STATUS: ICD-10-CM

## 2025-01-23 DIAGNOSIS — Z12.31 BREAST CANCER SCREENING BY MAMMOGRAM: ICD-10-CM

## 2025-01-23 DIAGNOSIS — I10 ESSENTIAL (PRIMARY) HYPERTENSION: ICD-10-CM

## 2025-01-23 DIAGNOSIS — Z00.00 MEDICARE ANNUAL WELLNESS VISIT, SUBSEQUENT: Primary | ICD-10-CM

## 2025-01-23 DIAGNOSIS — E66.09 CLASS 1 OBESITY DUE TO EXCESS CALORIES WITH SERIOUS COMORBIDITY AND BODY MASS INDEX (BMI) OF 33.0 TO 33.9 IN ADULT: ICD-10-CM

## 2025-01-23 LAB
ALBUMIN SERPL BCG-MCNC: 4.4 G/DL (ref 3.5–5.2)
ALP SERPL-CCNC: 127 U/L (ref 40–150)
ALT SERPL W P-5'-P-CCNC: 34 U/L (ref 0–50)
ANION GAP SERPL CALCULATED.3IONS-SCNC: 11 MMOL/L (ref 7–15)
AST SERPL W P-5'-P-CCNC: 34 U/L (ref 0–45)
BASOPHILS # BLD AUTO: 0 10E3/UL (ref 0–0.2)
BASOPHILS NFR BLD AUTO: 0 %
BILIRUB SERPL-MCNC: 0.6 MG/DL
BUN SERPL-MCNC: 21.4 MG/DL (ref 8–23)
CALCIUM SERPL-MCNC: 10.8 MG/DL (ref 8.8–10.4)
CHLORIDE SERPL-SCNC: 102 MMOL/L (ref 98–107)
CHOLEST SERPL-MCNC: 187 MG/DL
CREAT SERPL-MCNC: 1.03 MG/DL (ref 0.51–0.95)
CREAT UR-MCNC: 220 MG/DL
EGFRCR SERPLBLD CKD-EPI 2021: 58 ML/MIN/1.73M2
EOSINOPHIL # BLD AUTO: 0 10E3/UL (ref 0–0.7)
EOSINOPHIL NFR BLD AUTO: 1 %
ERYTHROCYTE [DISTWIDTH] IN BLOOD BY AUTOMATED COUNT: 12.3 % (ref 10–15)
EST. AVERAGE GLUCOSE BLD GHB EST-MCNC: 123 MG/DL
FASTING STATUS PATIENT QL REPORTED: YES
FASTING STATUS PATIENT QL REPORTED: YES
GLUCOSE SERPL-MCNC: 92 MG/DL (ref 70–99)
HBA1C MFR BLD: 5.9 % (ref 0–5.6)
HCO3 SERPL-SCNC: 27 MMOL/L (ref 22–29)
HCT VFR BLD AUTO: 45.5 % (ref 35–47)
HDLC SERPL-MCNC: 56 MG/DL
HGB BLD-MCNC: 15.4 G/DL (ref 11.7–15.7)
IMM GRANULOCYTES # BLD: 0 10E3/UL
IMM GRANULOCYTES NFR BLD: 0 %
LDLC SERPL CALC-MCNC: 111 MG/DL
LYMPHOCYTES # BLD AUTO: 1.1 10E3/UL (ref 0.8–5.3)
LYMPHOCYTES NFR BLD AUTO: 15 %
MCH RBC QN AUTO: 29.8 PG (ref 26.5–33)
MCHC RBC AUTO-ENTMCNC: 33.8 G/DL (ref 31.5–36.5)
MCV RBC AUTO: 88 FL (ref 78–100)
MICROALBUMIN UR-MCNC: 44.9 MG/L
MICROALBUMIN/CREAT UR: 20.41 MG/G CR (ref 0–25)
MONOCYTES # BLD AUTO: 0.6 10E3/UL (ref 0–1.3)
MONOCYTES NFR BLD AUTO: 7 %
NEUTROPHILS # BLD AUTO: 5.9 10E3/UL (ref 1.6–8.3)
NEUTROPHILS NFR BLD AUTO: 77 %
NONHDLC SERPL-MCNC: 131 MG/DL
PLATELET # BLD AUTO: 245 10E3/UL (ref 150–450)
POTASSIUM SERPL-SCNC: 3.7 MMOL/L (ref 3.4–5.3)
PROT SERPL-MCNC: 7.2 G/DL (ref 6.4–8.3)
RBC # BLD AUTO: 5.17 10E6/UL (ref 3.8–5.2)
SODIUM SERPL-SCNC: 140 MMOL/L (ref 135–145)
TRIGL SERPL-MCNC: 101 MG/DL
WBC # BLD AUTO: 7.7 10E3/UL (ref 4–11)

## 2025-01-23 RX ORDER — ATORVASTATIN CALCIUM 10 MG/1
10 TABLET, FILM COATED ORAL DAILY
Qty: 90 TABLET | Refills: 3 | Status: SHIPPED | OUTPATIENT
Start: 2025-01-23

## 2025-01-23 RX ORDER — HYDROCHLOROTHIAZIDE 12.5 MG/1
12.5 TABLET ORAL DAILY
Qty: 90 TABLET | Refills: 3 | Status: SHIPPED | OUTPATIENT
Start: 2025-01-23 | End: 2025-01-23

## 2025-01-23 RX ORDER — LISINOPRIL 10 MG/1
10 TABLET ORAL DAILY
Qty: 90 TABLET | Refills: 3 | Status: SHIPPED | OUTPATIENT
Start: 2025-01-23

## 2025-01-23 RX ORDER — LISINOPRIL 10 MG/1
10 TABLET ORAL DAILY
Qty: 90 TABLET | Refills: 3 | Status: SHIPPED | OUTPATIENT
Start: 2025-01-23 | End: 2025-01-23

## 2025-01-23 ASSESSMENT — PAIN SCALES - GENERAL: PAINLEVEL_OUTOF10: NO PAIN (0)

## 2025-01-23 NOTE — PROGRESS NOTES
"Preventive Care Visit  [facility] ANSHUL Fagan MD, Family Medicine  Jan 23, 2025      Assessment & Plan     Medicare annual wellness visit, subsequent  Patient declines DEXA scan.  She declines immunizations.  Patient is doing well for her age.  Continue healthy lifestyle.    Hyperlipidemia LDL goal <100  Controlled.  Continue atorvastatin.  Check labs and notify with results.  - atorvastatin (LIPITOR) 10 MG tablet; Take 1 tablet (10 mg) by mouth daily.  - Comprehensive metabolic panel; Future  - Lipid Profile; Future    Essential (primary) hypertension  Uncontrolled.  We will switch patient from hydrochlorothiazide 12.5 mg daily to lisinopril 10 mg daily.  Advised patient to monitor blood pressure at home.  Advised patient to follow-up in 1 month so we can recheck blood pressure and BMP.  - CBC with Platelets & Differential; Future  - lisinopril (ZESTRIL) 10 MG tablet; Take 1 tablet (10 mg) by mouth daily.    Chronic kidney disease, stage 3a (H)  Check labs and notify with results.  - Albumin Random Urine Quantitative with Creat Ratio; Future    Class 1 obesity due to excess calories with serious comorbidity and body mass index (BMI) of 33.0 to 33.9 in adult  Doing well.  Continue semaglutide 2 mg weekly.    Asymptomatic postmenopausal status  Patient declines DEXA scan after discussion.  Advised weightbearing exercise, including weights twice a week with continued walking routine.    Elevated fasting blood sugar  Check labs and notify with results.  - Hemoglobin A1c; Future    Breast cancer screening by mammogram  Mammogram ordered for routine breast cancer screening.  - MA Screen Bilateral w/Diego; Future            BMI  Estimated body mass index is 31.98 kg/m  as calculated from the following:    Height as of this encounter: 1.555 m (5' 1.22\").    Weight as of this encounter: 77.3 kg (170 lb 8 oz).   Weight management plan: Discussed healthy diet and exercise " guidelines    Counseling  Appropriate preventive services were addressed with this patient via screening, questionnaire, or discussion as appropriate for fall prevention, nutrition, physical activity, Tobacco-use cessation, social engagement, weight loss and cognition.  Checklist reviewing preventive services available has been given to the patient.  Reviewed patient's diet, addressing concerns and/or questions.   She is at risk for lack of exercise and has been provided with information to increase physical activity for the benefit of her well-being.           Shannan Loomis is a 69 year old, presenting for the following:  Annual Visit (Patient is here for a physical. Fasting. )        1/23/2025     7:32 AM   Additional Questions   Roomed by masha alvarado cma   Accompanied by self         Via the Health Maintenance questionnaire, the patient has reported the following services have been completed -Mammogram: [facility] 2023-12-20, this information has been sent to the abstraction team.    HPI    Exercise: Walking 2-3 times per week, 30-40 minutes.    Obesity: Patient has been tolerating Ozempic 2 mg weekly for the last 4 months.  She has lost 11 pounds because of reduced appetite with Ozempic.  She denies any side effects.    HTN: She checks home blood pressure 129/82, checks 7-10 days.  Patient is on hydrochlorothiazide 12.5 mg daily.    HLD: She is tolerating atorvastatin 10 mg daily without any side effects.          Health Care Directive  Patient does not have a Health Care Directive: Patient states has Advance Directive and will bring in a copy to clinic.      1/18/2025   General Health   How would you rate your overall physical health? Good   Feel stress (tense, anxious, or unable to sleep) Not at all         1/18/2025   Nutrition   Diet: Regular (no restrictions)         1/18/2025   Exercise   Days per week of moderate/strenous exercise 3 days   Average minutes spent exercising at this level 30 min          1/18/2025   Social Factors   Frequency of gathering with friends or relatives Twice a week   Worry food won't last until get money to buy more No   Food not last or not have enough money for food? No   Do you have housing? (Housing is defined as stable permanent housing and does not include staying ouside in a car, in a tent, in an abandoned building, in an overnight shelter, or couch-surfing.) Yes   Are you worried about losing your housing? No   Lack of transportation? No   Unable to get utilities (heat,electricity)? No         1/18/2025   Fall Risk   Fallen 2 or more times in the past year? No    No   Trouble with walking or balance? No    No       Multiple values from one day are sorted in reverse-chronological order          1/18/2025   Activities of Daily Living- Home Safety   Needs help with the following daily activites None of the above   Safety concerns in the home None of the above         1/18/2025   Dental   Dentist two times every year? Yes         1/18/2025   Hearing Screening   Hearing concerns? None of the above         1/18/2025   Driving Risk Screening   Patient/family members have concerns about driving No         1/18/2025   General Alertness/Fatigue Screening   Have you been more tired than usual lately? No         1/18/2025   Urinary Incontinence Screening   Bothered by leaking urine in past 6 months No         1/18/2025   TB Screening   Were you born outside of the [place]? No         Today's PHQ-2 Score:       1/23/2025     7:14 AM   PHQ-2 ( 1999 Pf[employer])   Q1: Little interest or pleasure in doing things 0   Q2: Feeling down, depressed or hopeless 0   PHQ-2 Score 0    Q1: Little interest or pleasure in doing things Not at all   Q2: Feeling down, depressed or hopeless Not at all   PHQ-2 Score 0       Patient-reported           1/18/2025   Substance Use   Alcohol more than 3/day or more than 7/wk No   Do you have a current opioid prescription? No   How severe/bad is pain from 1 to 10? 0/10  (No Pain)   Do you use any other substances recreationally? No     Social History     Tobacco Use    Smoking status: Never     Passive exposure: Never    Smokeless tobacco: Never   Vaping Use    Vaping status: Never Used   Substance Use Topics    Alcohol use: Yes     Comment: Alcoholic Drinks/day: 2 drinks/month    Drug use: Never           11/11/2022   LAST FHS-7 RESULTS   1st degree relative breast or ovarian cancer No   Any relative bilateral breast cancer No   Any male have breast cancer No   Any ONE woman have BOTH breast AND ovarian cancer No   Any woman with breast cancer before 50yrs No   2 or more relatives with breast AND/OR ovarian cancer No   2 or more relatives with breast AND/OR bowel cancer No              History of abnormal Pap smear: No - age 65 or older with adequate negative prior screening test results (3 consecutive negative cytology results, 2 consecutive negative cotesting results, or 2 consecutive negative HrHPV test results within 10 years, with the most recent test occurring within the recommended screening interval for the test used)       ASCVD Risk   The 10-year ASCVD risk score (Divya DK, et al., 2019) is: 16.4%    Values used to calculate the score:      Age: 69 years      Sex: Female      Is Non- : No      Diabetic: No      Tobacco smoker: No      Systolic Blood Pressure: 150 mmHg      Is BP treated: Yes      HDL Cholesterol: 57 mg/dL      Total Cholesterol: 184 mg/dL            Reviewed and updated as needed this visit by Provider      Problems                 Current providers sharing in care for this patient include:  Patient Care Team:  Kaveh Fagan MD as PCP - General (Family Medicine)  Kaveh Fagan MD as Assigned PCP    The following health maintenance items are reviewed in Epic and correct as of today:  Health Maintenance   Topic Date Due    DEXA  Never done    INFLUENZA VACCINE (1) Never done    COVID-19 Vaccine (6 - 2024-25 season)  "09/01/2024    MAMMO SCREENING  11/11/2024    MICROALBUMIN  01/11/2025    ANNUAL REVIEW OF HM ORDERS  01/11/2025    MEDICARE ANNUAL WELLNESS VISIT  01/11/2025    HEMOGLOBIN  01/11/2025    BMP  06/20/2025    LIPID  06/20/2025    FALL RISK ASSESSMENT  01/23/2026    COLORECTAL CANCER SCREENING  11/03/2026    GLUCOSE  06/20/2027    ADVANCE CARE PLANNING  01/11/2029    DTAP/TDAP/TD IMMUNIZATION (4 - Td or Tdap) 01/12/2034    PHQ-2 (once per calendar year)  Completed    Pneumococcal Vaccine: 50+ Years  Completed    URINALYSIS  Completed    ZOSTER IMMUNIZATION  Completed    RSV VACCINE  Completed    HPV IMMUNIZATION  Aged Out    MENINGITIS IMMUNIZATION  Aged Out    RSV MONOCLONAL ANTIBODY  Aged Out    HEPATITIS C SCREENING  Discontinued         Review of Systems  Constitutional, HEENT, cardiovascular, pulmonary, gi and gu systems are negative, except as otherwise noted.     Objective    Exam  BP (!) 150/93 (BP Location: Left arm, Patient Position: Sitting, Cuff Size: Adult Regular)   Pulse 99   Temp 97.3  F (36.3  C) (Temporal)   Resp 14   Ht 1.555 m (5' 1.22\")   Wt 77.3 kg (170 lb 8 oz)   LMP  (LMP Unknown)   SpO2 94%   BMI 31.98 kg/m     Estimated body mass index is 31.98 kg/m  as calculated from the following:    Height as of this encounter: 1.555 m (5' 1.22\").    Weight as of this encounter: 77.3 kg (170 lb 8 oz).    Physical Exam  GENERAL: alert and no distress  EYES: Eyes grossly normal to inspection, PERRL and conjunctivae and sclerae normal  HENT: ear canals and TM's normal, nose and mouth without ulcers or lesions  NECK: no adenopathy, no asymmetry, masses, or scars  RESP: lungs clear to auscultation - no rales, rhonchi or wheezes  CV: regular rate and rhythm, normal S1 S2, no S3 or S4, no murmur, click or rub, no peripheral edema  MS: no gross musculoskeletal defects noted, no edema  SKIN: no suspicious lesions or rashes  NEURO: Normal strength and tone, mentation intact and speech normal  PSYCH: mentation " appears normal, affect normal/bright        1/23/2025   Mini Cog   Clock Draw Score 2 Normal   3 Item Recall 2 objects recalled   Mini Cog Total Score 4              Signed Electronically by: Kaveh Fagan MD

## 2025-01-29 ENCOUNTER — HOSPITAL ENCOUNTER (OUTPATIENT)
Dept: MAMMOGRAPHY | Facility: CLINIC | Age: 70
Discharge: HOME | End: 2025-01-29
Attending: FAMILY MEDICINE
Payer: COMMERCIAL

## 2025-01-29 DIAGNOSIS — Z12.31 BREAST CANCER SCREENING BY MAMMOGRAM: ICD-10-CM

## 2025-01-29 PROCEDURE — 77063 BREAST TOMOSYNTHESIS BI: CPT

## 2025-02-27 ENCOUNTER — OFFICE VISIT (OUTPATIENT)
Dept: FAMILY MEDICINE | Facility: CLINIC | Age: 70
End: 2025-02-27
Attending: FAMILY MEDICINE
Payer: COMMERCIAL

## 2025-02-27 VITALS
DIASTOLIC BLOOD PRESSURE: 89 MMHG | HEART RATE: 79 BPM | WEIGHT: 165.8 LBS | BODY MASS INDEX: 31.3 KG/M2 | RESPIRATION RATE: 16 BRPM | TEMPERATURE: 97.1 F | HEIGHT: 61 IN | OXYGEN SATURATION: 96 % | SYSTOLIC BLOOD PRESSURE: 136 MMHG

## 2025-02-27 DIAGNOSIS — Z78.0 ASYMPTOMATIC POSTMENOPAUSAL STATUS: ICD-10-CM

## 2025-02-27 DIAGNOSIS — E66.811 CLASS 1 OBESITY DUE TO EXCESS CALORIES WITH SERIOUS COMORBIDITY AND BODY MASS INDEX (BMI) OF 33.0 TO 33.9 IN ADULT: ICD-10-CM

## 2025-02-27 DIAGNOSIS — I10 ESSENTIAL HYPERTENSION, BENIGN: Primary | ICD-10-CM

## 2025-02-27 DIAGNOSIS — Z23 IMMUNIZATION DUE: ICD-10-CM

## 2025-02-27 DIAGNOSIS — E66.09 CLASS 1 OBESITY DUE TO EXCESS CALORIES WITH SERIOUS COMORBIDITY AND BODY MASS INDEX (BMI) OF 33.0 TO 33.9 IN ADULT: ICD-10-CM

## 2025-02-27 LAB
ANION GAP SERPL CALCULATED.3IONS-SCNC: 9 MMOL/L (ref 7–15)
BUN SERPL-MCNC: 19.3 MG/DL (ref 8–23)
CALCIUM SERPL-MCNC: 10 MG/DL (ref 8.8–10.4)
CHLORIDE SERPL-SCNC: 107 MMOL/L (ref 98–107)
CREAT SERPL-MCNC: 1 MG/DL (ref 0.51–0.95)
EGFRCR SERPLBLD CKD-EPI 2021: 60 ML/MIN/1.73M2
GLUCOSE SERPL-MCNC: 100 MG/DL (ref 70–99)
HCO3 SERPL-SCNC: 24 MMOL/L (ref 22–29)
POTASSIUM SERPL-SCNC: 4.5 MMOL/L (ref 3.4–5.3)
SODIUM SERPL-SCNC: 140 MMOL/L (ref 135–145)

## 2025-02-27 RX ORDER — HYDROCHLOROTHIAZIDE 12.5 MG/1
1 TABLET ORAL
COMMUNITY
Start: 2025-01-23 | End: 2025-02-27

## 2025-02-27 NOTE — PROGRESS NOTES
Assessment & Plan     Essential hypertension, benign  Controlled with lisinopril 10 mg daily.  Check BMP to monitor for potassium and creatinine changes.  Advised patient to monitor blood pressure at home.  Follow-up in 6 to 12 months or sooner if she is noticing blood pressure readings greater than 140/90.  - Basic metabolic panel; Future  - Basic metabolic panel    Class 1 obesity due to excess calories with serious comorbidity and body mass index (BMI) of 33.0 to 33.9 in adult  I will talk with our pharmacist to see if there is an alternative/more affordable medication for weight loss, such as Zepbound vials.    Asymptomatic postmenopausal status  Patient declines DEXA.    Immunization due  Advised patient to have COVID-19 and influenza vaccines, which she will do at [facility] pharmacy.        The longitudinal plan of care for the diagnosis(es)/condition(s) as documented were addressed during this visit. Due to the added complexity in care, I will continue to support Ebonie in the subsequent management and with ongoing continuity of care.          Shannan Loomis is a 69 year old, presenting for the following health issues:  Blood Pressure Check (Patient is here for a blood pressure check. Fasting. )        2/27/2025     7:49 AM   Additional Questions   Roomed by masha alvarado cma   Accompanied by self     History of Present Illness       Reason for visit:  Follow up on blood pressures med   She is taking medications regularly.     HTN: Patient is following up from visit 1 month ago where we changed her blood pressure medication from hydrochlorothiazide 12.5 mg daily to lisinopril 10 mg daily because her blood pressure was 150/93 at her last visit.  At her last visit we also checked her potassium, which was 3.7 and her creatinine was 1.03 (stable).  She denies any side effects with lisinopril 10 mg daily.    Obesity: Patient has been doing well with Ozempic 2 mg weekly, she has lost 20 pounds in the last 4 months.   "She denies any side effects with Ozempic.  She has been getting Ozempic at the compounding pharmacy and unfortunately the medication will not be able to be compounded any longer.  Ozempic prescription is not being covered with her insurance for weight loss and she is unable to afford the cash price at the pharmacy.  She is asking about alternative treatments.              Review of Systems  Constitutional, HEENT, cardiovascular, pulmonary, gi and gu systems are negative, except as otherwise noted.      Objective    /89 (BP Location: Left arm, Patient Position: Sitting, Cuff Size: Adult Regular)   Pulse 79   Temp 97.1  F (36.2  C) (Temporal)   Resp 16   Ht 1.555 m (5' 1.22\")   Wt 75.2 kg (165 lb 12.8 oz)   LMP  (LMP Unknown)   SpO2 96%   BMI 31.10 kg/m    Body mass index is 31.1 kg/m .  Physical Exam  GENERAL: alert and no distress  PSYCH: mentation appears normal, affect normal/bright            Signed Electronically by: Kaveh Fagan MD    "

## 2025-03-14 ENCOUNTER — TRANSFERRED RECORDS (OUTPATIENT)
Dept: HEALTH INFORMATION MANAGEMENT | Facility: CLINIC | Age: 70
End: 2025-03-14
Payer: COMMERCIAL

## 2025-06-17 ENCOUNTER — OFFICE VISIT (OUTPATIENT)
Dept: FAMILY MEDICINE | Facility: CLINIC | Age: 70
End: 2025-06-17
Payer: COMMERCIAL

## 2025-06-17 VITALS
OXYGEN SATURATION: 96 % | RESPIRATION RATE: 16 BRPM | HEART RATE: 70 BPM | DIASTOLIC BLOOD PRESSURE: 82 MMHG | WEIGHT: 166 LBS | HEIGHT: 61 IN | SYSTOLIC BLOOD PRESSURE: 129 MMHG | BODY MASS INDEX: 31.34 KG/M2 | TEMPERATURE: 97.4 F

## 2025-06-17 DIAGNOSIS — Z01.818 PREOPERATIVE EXAMINATION: Primary | ICD-10-CM

## 2025-06-17 DIAGNOSIS — E66.811 CLASS 1 OBESITY DUE TO EXCESS CALORIES WITH SERIOUS COMORBIDITY AND BODY MASS INDEX (BMI) OF 33.0 TO 33.9 IN ADULT: ICD-10-CM

## 2025-06-17 DIAGNOSIS — H25.013 CORTICAL AGE-RELATED CATARACT OF BOTH EYES: ICD-10-CM

## 2025-06-17 DIAGNOSIS — I10 ESSENTIAL HYPERTENSION, BENIGN: ICD-10-CM

## 2025-06-17 DIAGNOSIS — E66.09 CLASS 1 OBESITY DUE TO EXCESS CALORIES WITH SERIOUS COMORBIDITY AND BODY MASS INDEX (BMI) OF 33.0 TO 33.9 IN ADULT: ICD-10-CM

## 2025-06-17 DIAGNOSIS — Z78.0 ASYMPTOMATIC POSTMENOPAUSAL STATUS: ICD-10-CM

## 2025-06-17 PROCEDURE — 3079F DIAST BP 80-89 MM HG: CPT | Performed by: FAMILY MEDICINE

## 2025-06-17 PROCEDURE — 3074F SYST BP LT 130 MM HG: CPT | Performed by: FAMILY MEDICINE

## 2025-06-17 PROCEDURE — 99214 OFFICE O/P EST MOD 30 MIN: CPT | Performed by: FAMILY MEDICINE

## 2025-06-17 PROCEDURE — G2211 COMPLEX E/M VISIT ADD ON: HCPCS | Performed by: FAMILY MEDICINE

## 2025-06-17 RX ORDER — HYDROCHLOROTHIAZIDE 25 MG/1
25 TABLET ORAL DAILY
Qty: 90 TABLET | Refills: 3 | Status: SHIPPED | OUTPATIENT
Start: 2025-06-17

## 2025-06-17 RX ORDER — HYDROCHLOROTHIAZIDE 12.5 MG/1
1 TABLET ORAL DAILY
COMMUNITY
Start: 2025-04-22 | End: 2025-06-17

## 2025-06-17 NOTE — PROGRESS NOTES
Preoperative Evaluation  [facility]  [address] [address]  Phone: [phone]  Fax: [phone]  Primary Provider: Kaveh Fagan MD  Pre-op Performing Provider: Kaveh Fagan MD  Jun 17, 2025 6/12/2025   Surgical Information   What procedure is being done? Pre -op for cataracts eye surgery   Facility or Hospital where procedure/surgery will be performed: [facility] in [place]   Who is doing the procedure / surgery? Dr. Shan Hobson   Date of surgery / procedure: July 11 right eye July 25 left eye   Time of surgery / procedure: 8:00 am   Where do you plan to recover after surgery? at home with family     Fax number for surgical facility: [phone]    Assessment & Plan     The proposed surgical procedure is considered LOW risk.    Assessment & Plan  Preoperative examination:  - Patient is low risk for cardiovascular complications and may proceed with cataract surgery without need for further testing.  - Proceed with cataract surgery on July 11th and July 25th, 2025. Continue all other medications except Zepbound, which should not be taken the week of surgery.    Cortical age-related cataract of both eyes:  - Cataracts confirmed, causing vision impairment, especially at night.  - Cataract surgery scheduled for July 11th and July 25th, 2025.    Essential hypertension, benign:  - Blood pressure readings at home are better than office readings. Current medication is hydrochlorothiazide 12.5 mg.  - Increase hydrochlorothiazide to 25 mg. Recheck blood pressure and labs in October to ensure kidney function is stable with new dosage.    Class 1 obesity due to excess calories with serious comorbidity and body mass index (BMI) of 33.0 to 33.9 in adult:  - Weight loss achieved with previous use of Zepbound. Patient interested in continuing weight loss treatment.  - Prescription for weight loss medication through [facility], starting at 2.5 mg,  with potential increase to 5 mg and 7.5 mg based on response. Patient to contact via [employer] for dosage adjustments.    Asymptomatic postmenopausal status  Patient declines DEXA.         The longitudinal plan of care for the diagnosis(es)/condition(s) as documented were addressed during this visit. Due to the added complexity in care, I will continue to support Ebonie in the subsequent management and with ongoing continuity of care.       - No identified additional risk factors other than previously addressed    Antiplatelet or Anticoagulation Medication Instructions   - We reviewed the medication list and the patient is not on an antiplatelet or anticoagulation medications.    Additional Medication Instructions   - ACE/ARB/ARNI (lisinopril, enalapril, losartan, valsartan, olmesartan, sacubritril/valsartan) : Continue without modification (e.g., MAC anesthesia, neurosurgery, spine surgery, heart failure, or labile hypertension with risk of hypertension).   - Statins (atorvastatin, simvastatin, pravastatin) : Continue taking on the day of surgery.   Zepbound - do not take week of surgery.    Recommendation  Approval given to proceed with proposed procedure, without further diagnostic evaluation.        Subjective   Ebonie is a 69 year old, presenting for the following:  Pre-Op Exam (Patient is here for a pre op for cataracts on July 11th is the right eye and [facility] [place]. [phone]  Fasting. ) and Cough (Patient is having a dry cough since April. )          6/17/2025     8:47 AM   Additional Questions   Roomed by masha alvarado cma   Accompanied by self     History of Present Illness-  Ebonie Garcia, age: 69 years    Cataracts  - Difficulty seeing, especially at night, noticed in February and March  - Driving signs becoming unclear, requiring closer proximity to read  - Right eye affected more, previously the better eye  - Prescription unchanged, diagnosis of cataracts confirmed  in early March    Hypertension  - Blood pressure readings between 129/82 and 134/85 at home  - Lower readings at home compared to office visits  - No heart problems reported  - Currently taking hydrochlorothiazide 12.5 mg    Weight Management  - Discontinued Zepbound weight loss shot in April  - Off medication for nearly two months  - Experienced appetite suppression with previous doses  - Weight loss of 15 lbs since last year         6/12/2025   Pre-Op Questionnaire   Have you ever had a heart attack or stroke? No   Have you ever had surgery on your heart or blood vessels, such as a stent placement, a coronary artery bypass, or surgery on an artery in your head, neck, heart, or legs? No   Do you have chest pain with activity? No   Do you have a history of heart failure? No   Do you currently have a cold, bronchitis or symptoms of other infection? No   Do you have a cough, shortness of breath, or wheezing? No   Do you or anyone in your family have previous history of blood clots? No   Do you or does anyone in your family have a serious bleeding problem such as prolonged bleeding following surgeries or cuts? No   Have you ever had problems with anemia or been told to take iron pills? No   Have you had any abnormal blood loss such as black, tarry or bloody stools, or abnormal vaginal bleeding? No   Have you ever had a blood transfusion? No   Are you willing to have a blood transfusion if it is medically needed before, during, or after your surgery? Yes   Have you or any of your relatives ever had problems with anesthesia? No   Do you have sleep apnea, excessive snoring or daytime drowsiness? No   Do you have any artifical heart valves or other implanted medical devices like a pacemaker, defibrillator, or continuous glucose monitor? No   Do you have artificial joints? (!) YES   Are you allergic to latex? No     Advance Care Planning    Patient states has Health Care Directive and will send to [facility].    Preoperative Review of    reviewed - no record of controlled substances prescribed.          Patient Active Problem List    Diagnosis Date Noted    Hyperlipidemia LDL goal <100 06/20/2024     Priority: Medium    Class 1 obesity due to excess calories with serious comorbidity and body mass index (BMI) of 33.0 to 33.9 in adult 06/20/2024     Priority: Medium    Chronic kidney disease, stage 3a (H) 01/11/2024     Priority: Medium    Benign neoplasm of descending colon 10/05/2020     Priority: Medium    Status post total replacement of left hip 06/11/2020     Priority: Medium    Essential hypertension, benign      Priority: Medium    Primary osteoarthritis of left hip 06/10/2020     Priority: Medium    Prediabetes 01/08/2020     Priority: Medium     A1c 5.8% 1/2020 (this reflects a 45lb weight loss in prior year).         Diverticular disease of colon 06/10/2010     Priority: Medium      Past Medical History:   Diagnosis Date    Arthritis     Hypertension     Type 2 diabetes mellitus treated without insulin (H)      Past Surgical History:   Procedure Laterality Date    ZZC TOTAL HIP ARTHROPLASTY Left 6/10/2020    Procedure: DIRECT ANTERIOR TOTAL HIP ARTHROPLASTY;  Surgeon: Be Whitt MD;  Location: [facility] OR;  Service: Orthopedics     Current Outpatient Medications   Medication Sig Dispense Refill    atorvastatin (LIPITOR) 10 MG tablet Take 1 tablet (10 mg) by mouth daily. 90 tablet 3    hydroCHLOROthiazide 12.5 MG tablet Take 1 tablet by mouth daily.      MULTIVITAMIN OR None Entered      lisinopril (ZESTRIL) 10 MG tablet Take 1 tablet (10 mg) by mouth daily. (Patient not taking: Reported on 6/17/2025) 90 tablet 3    ZEPBOUND 2.5 MG/0.5ML prefilled pen Inject 0.5 mLs (2.5 mg) subcutaneously every 7 days. (Patient not taking: Reported on 6/17/2025) 2 mL 2       No Known Allergies     Social History     Tobacco Use    Smoking status: Never     Passive exposure: Never    Smokeless tobacco:  "Never   Substance Use Topics    Alcohol use: Yes     Comment: Alcoholic Drinks/day: 2 drinks/month       History   Drug Use Unknown             Review of Systems  CONSTITUTIONAL: NEGATIVE for fever, chills, change in weight  INTEGUMENTARY/SKIN: NEGATIVE for worrisome rashes, moles or lesions  ENT/MOUTH: NEGATIVE for ear, mouth and throat problems  RESP: NEGATIVE for significant cough or SOB  BREAST: NEGATIVE for masses, tenderness or discharge  CV: NEGATIVE for chest pain, palpitations or peripheral edema  GI: NEGATIVE for nausea, abdominal pain, heartburn, or change in bowel habits  : NEGATIVE for frequency, dysuria, or hematuria  MUSCULOSKELETAL: NEGATIVE for significant arthralgias or myalgia  NEURO: NEGATIVE for weakness, dizziness or paresthesias  ENDOCRINE: NEGATIVE for temperature intolerance, skin/hair changes  HEME: NEGATIVE for bleeding problems  PSYCHIATRIC: NEGATIVE for changes in mood or affect    Objective    BP (!) 150/89 (BP Location: Left arm, Patient Position: Sitting, Cuff Size: Adult Regular)   Pulse 70   Temp 97.4  F (36.3  C) (Temporal)   Resp 16   Ht 1.555 m (5' 1.22\")   Wt 75.3 kg (166 lb)   LMP  (LMP Unknown)   SpO2 96%   BMI 31.14 kg/m     Estimated body mass index is 31.14 kg/m  as calculated from the following:    Height as of this encounter: 1.555 m (5' 1.22\").    Weight as of this encounter: 75.3 kg (166 lb).  Physical Exam  GENERAL: alert and no distress  EYES: Eyes grossly normal to inspection, PERRL and conjunctivae and sclerae normal  HENT: ear canals and TM's normal, nose and mouth without ulcers or lesions  NECK: no adenopathy, no asymmetry, masses, or scars  RESP: lungs clear to auscultation - no rales, rhonchi or wheezes  CV: regular rate and rhythm, normal S1 S2, no S3 or S4, no murmur, click or rub, no peripheral edema  ABDOMEN: soft, nontender, no hepatosplenomegaly, no masses and bowel sounds normal  MS: no gross musculoskeletal defects noted, no edema  SKIN: no " suspicious lesions or rashes  NEURO: Normal strength and tone, mentation intact and speech normal  PSYCH: mentation appears normal, affect normal/bright    Recent Labs   Lab Test 02/27/25  0831 01/23/25  0810 06/20/24  0853   HGB  --  15.4  --    PLT  --  245  --     140 139   POTASSIUM 4.5 3.7 4.2   CR 1.00* 1.03* 1.10*   A1C  --  5.9* 5.9*        Diagnostics  No labs were ordered during this visit.   No EKG required for low risk surgery (cataract, skin procedure, breast biopsy, etc).    Revised Cardiac Risk Index (RCRI)  The patient has the following serious cardiovascular risks for perioperative complications:   - No serious cardiac risks = 0 points     RCRI Interpretation: 0 points: Class I (very low risk - 0.4% complication rate)         Signed Electronically by: Kaveh Fagan MD  A copy of this evaluation report is provided to the requesting physician.

## 2025-07-02 ENCOUNTER — MYC REFILL (OUTPATIENT)
Dept: FAMILY MEDICINE | Facility: CLINIC | Age: 70
End: 2025-07-02
Payer: COMMERCIAL

## 2025-07-02 DIAGNOSIS — E66.09 CLASS 1 OBESITY DUE TO EXCESS CALORIES WITH SERIOUS COMORBIDITY AND BODY MASS INDEX (BMI) OF 33.0 TO 33.9 IN ADULT: ICD-10-CM

## 2025-07-02 DIAGNOSIS — E66.811 CLASS 1 OBESITY DUE TO EXCESS CALORIES WITH SERIOUS COMORBIDITY AND BODY MASS INDEX (BMI) OF 33.0 TO 33.9 IN ADULT: ICD-10-CM

## 2025-07-02 NOTE — TELEPHONE ENCOUNTER
Patient calling requesting that RN's call [facility], to get clarification on this. Patient frustrated, they told her that the RX provider filled out was incorrect. She states that [facility] contacted the clinic on  6/17/2025. Please advise to assist asap.

## 2025-08-05 ENCOUNTER — E-VISIT (OUTPATIENT)
Dept: FAMILY MEDICINE | Facility: CLINIC | Age: 70
End: 2025-08-05
Payer: COMMERCIAL

## 2025-08-05 DIAGNOSIS — E66.09 CLASS 1 OBESITY DUE TO EXCESS CALORIES WITH SERIOUS COMORBIDITY AND BODY MASS INDEX (BMI) OF 33.0 TO 33.9 IN ADULT: Primary | ICD-10-CM

## 2025-08-05 DIAGNOSIS — E66.811 CLASS 1 OBESITY DUE TO EXCESS CALORIES WITH SERIOUS COMORBIDITY AND BODY MASS INDEX (BMI) OF 33.0 TO 33.9 IN ADULT: Primary | ICD-10-CM

## 2025-08-05 PROCEDURE — 99207 PR NON-BILLABLE SERV PER CHARTING: CPT | Performed by: FAMILY MEDICINE

## 2025-08-23 ENCOUNTER — HEALTH MAINTENANCE LETTER (OUTPATIENT)
Age: 70
End: 2025-08-23